# Patient Record
Sex: FEMALE | Race: WHITE | Employment: OTHER | URBAN - METROPOLITAN AREA
[De-identification: names, ages, dates, MRNs, and addresses within clinical notes are randomized per-mention and may not be internally consistent; named-entity substitution may affect disease eponyms.]

---

## 2017-01-26 ENCOUNTER — LAB ENCOUNTER (OUTPATIENT)
Dept: LAB | Facility: HOSPITAL | Age: 82
End: 2017-01-26
Attending: INTERNAL MEDICINE
Payer: MEDICARE

## 2017-01-26 DIAGNOSIS — I25.10 CORONARY ATHEROSCLEROSIS OF NATIVE CORONARY ARTERY: Primary | ICD-10-CM

## 2017-02-17 ENCOUNTER — APPOINTMENT (OUTPATIENT)
Dept: LAB | Facility: HOSPITAL | Age: 82
End: 2017-02-17
Attending: INTERNAL MEDICINE
Payer: MEDICARE

## 2017-02-17 ENCOUNTER — PRIOR ORIGINAL RECORDS (OUTPATIENT)
Dept: OTHER | Age: 82
End: 2017-02-17

## 2017-02-17 LAB
ALBUMIN SERPL BCP-MCNC: 4 G/DL (ref 3.5–4.8)
ALBUMIN/GLOB SERPL: 1.6 {RATIO} (ref 1–2)
ALP SERPL-CCNC: 64 U/L (ref 32–100)
ALT SERPL-CCNC: 15 U/L (ref 14–54)
ANION GAP SERPL CALC-SCNC: 10 MMOL/L (ref 0–18)
AST SERPL-CCNC: 23 U/L (ref 15–41)
BILIRUB SERPL-MCNC: 1.4 MG/DL (ref 0.3–1.2)
BUN SERPL-MCNC: 13 MG/DL (ref 8–20)
BUN/CREAT SERPL: 18.3 (ref 10–20)
CALCIUM SERPL-MCNC: 9.3 MG/DL (ref 8.5–10.5)
CHLORIDE SERPL-SCNC: 106 MMOL/L (ref 95–110)
CHOLEST SERPL-MCNC: 127 MG/DL (ref 110–200)
CO2 SERPL-SCNC: 23 MMOL/L (ref 22–32)
CREAT SERPL-MCNC: 0.71 MG/DL (ref 0.5–1.5)
GLOBULIN PLAS-MCNC: 2.5 G/DL (ref 2.5–3.7)
GLUCOSE SERPL-MCNC: 97 MG/DL (ref 70–99)
HDLC SERPL-MCNC: 44 MG/DL
LDLC SERPL CALC-MCNC: 59 MG/DL (ref 0–99)
NONHDLC SERPL-MCNC: 83 MG/DL
OSMOLALITY UR CALC.SUM OF ELEC: 288 MOSM/KG (ref 275–295)
POTASSIUM SERPL-SCNC: 4.5 MMOL/L (ref 3.3–5.1)
PROT SERPL-MCNC: 6.5 G/DL (ref 5.9–8.4)
SODIUM SERPL-SCNC: 139 MMOL/L (ref 136–144)
TRIGL SERPL-MCNC: 121 MG/DL (ref 1–149)

## 2017-02-17 PROCEDURE — 80061 LIPID PANEL: CPT

## 2017-02-17 PROCEDURE — 80053 COMPREHEN METABOLIC PANEL: CPT

## 2017-02-17 PROCEDURE — 36415 COLL VENOUS BLD VENIPUNCTURE: CPT

## 2017-03-07 ENCOUNTER — PRIOR ORIGINAL RECORDS (OUTPATIENT)
Dept: OTHER | Age: 82
End: 2017-03-07

## 2017-03-07 LAB
ALBUMIN: 4 G/DL
ALT (SGPT): 15 U/L
AST (SGOT): 23 U/L
BILIRUBIN TOTAL: 1.4 MG/DL
BUN: 13 MG/DL
CALCIUM: 9.3 MG/DL
CHLORIDE: 106 MEQ/L
CHOLESTEROL, TOTAL: 127 MG/DL
CREATININE, SERUM: 0.71 MG/DL
GLOBULIN: 2.5 G/DL
GLUCOSE: 97 MG/DL
GLUCOSE: 97 MG/DL
HDL CHOLESTEROL: 44 MG/DL
LDL CHOLESTEROL: 59 MG/DL
NON-HDL CHOLESTEROL: 83 MG/DL
POTASSIUM, SERUM: 4.5 MEQ/L
PROTEIN, TOTAL: 6.5 G/DL
SGOT (AST): 23 IU/L
SGPT (ALT): 15 IU/L
SODIUM: 139 MEQ/L
TRIGLYCERIDES: 121 MG/DL

## 2017-12-20 ENCOUNTER — PRIOR ORIGINAL RECORDS (OUTPATIENT)
Dept: OTHER | Age: 82
End: 2017-12-20

## 2018-04-06 ENCOUNTER — LAB ENCOUNTER (OUTPATIENT)
Dept: LAB | Facility: HOSPITAL | Age: 83
End: 2018-04-06
Attending: INTERNAL MEDICINE
Payer: MEDICARE

## 2018-04-06 DIAGNOSIS — M47.816 LUMBAR SPONDYLOSIS: ICD-10-CM

## 2018-04-06 DIAGNOSIS — M25.561 CHRONIC PAIN OF RIGHT KNEE: ICD-10-CM

## 2018-04-06 DIAGNOSIS — I10 ESSENTIAL HYPERTENSION: ICD-10-CM

## 2018-04-06 DIAGNOSIS — M19.90 ARTHRITIS: ICD-10-CM

## 2018-04-06 DIAGNOSIS — G89.29 CHRONIC PAIN OF RIGHT KNEE: ICD-10-CM

## 2018-04-06 DIAGNOSIS — E78.00 HIGH CHOLESTEROL: ICD-10-CM

## 2018-04-06 PROCEDURE — 80061 LIPID PANEL: CPT

## 2018-04-06 PROCEDURE — 80076 HEPATIC FUNCTION PANEL: CPT

## 2018-04-06 PROCEDURE — 84443 ASSAY THYROID STIM HORMONE: CPT

## 2018-04-06 PROCEDURE — 80069 RENAL FUNCTION PANEL: CPT

## 2018-04-06 PROCEDURE — 36415 COLL VENOUS BLD VENIPUNCTURE: CPT

## 2018-04-06 PROCEDURE — 85025 COMPLETE CBC W/AUTO DIFF WBC: CPT

## 2018-05-18 PROBLEM — M54.16 LUMBAR RADICULOPATHY: Status: ACTIVE | Noted: 2018-05-18

## 2018-05-18 PROBLEM — E78.00 HIGH CHOLESTEROL: Status: ACTIVE | Noted: 2018-05-18

## 2018-05-18 PROBLEM — M25.561 CHRONIC PAIN OF RIGHT KNEE: Status: ACTIVE | Noted: 2018-05-18

## 2018-05-18 PROBLEM — G89.29 CHRONIC PAIN OF RIGHT KNEE: Status: ACTIVE | Noted: 2018-05-18

## 2018-05-18 PROBLEM — I10 ESSENTIAL HYPERTENSION: Status: ACTIVE | Noted: 2018-05-18

## 2018-07-22 PROBLEM — I70.0 AORTIC ATHEROSCLEROSIS: Status: ACTIVE | Noted: 2018-07-22

## 2018-07-22 PROBLEM — I70.0 AORTIC ATHEROSCLEROSIS (HCC): Status: ACTIVE | Noted: 2018-07-22

## 2018-08-07 ENCOUNTER — PRIOR ORIGINAL RECORDS (OUTPATIENT)
Dept: OTHER | Age: 83
End: 2018-08-07

## 2018-12-19 ENCOUNTER — MYAURORA ACCOUNT LINK (OUTPATIENT)
Dept: OTHER | Age: 83
End: 2018-12-19

## 2018-12-19 ENCOUNTER — PRIOR ORIGINAL RECORDS (OUTPATIENT)
Dept: OTHER | Age: 83
End: 2018-12-19

## 2019-02-28 VITALS
HEIGHT: 58 IN | SYSTOLIC BLOOD PRESSURE: 96 MMHG | WEIGHT: 170 LBS | BODY MASS INDEX: 35.68 KG/M2 | DIASTOLIC BLOOD PRESSURE: 56 MMHG | HEART RATE: 58 BPM | OXYGEN SATURATION: 98 %

## 2019-02-28 VITALS
BODY MASS INDEX: 35.48 KG/M2 | DIASTOLIC BLOOD PRESSURE: 68 MMHG | RESPIRATION RATE: 18 BRPM | HEIGHT: 58 IN | WEIGHT: 169 LBS | SYSTOLIC BLOOD PRESSURE: 120 MMHG | HEART RATE: 53 BPM

## 2019-04-19 RX ORDER — NAPROXEN 500 MG/1
TABLET ORAL
COMMUNITY
End: 2021-05-12 | Stop reason: ALTCHOICE

## 2019-04-19 RX ORDER — LOSARTAN POTASSIUM 50 MG/1
50 TABLET ORAL DAILY
COMMUNITY

## 2019-04-19 RX ORDER — METOPROLOL TARTRATE 50 MG/1
50 TABLET, FILM COATED ORAL 2 TIMES DAILY
COMMUNITY

## 2019-04-19 RX ORDER — HYDROCHLOROTHIAZIDE 12.5 MG/1
12.5 TABLET ORAL DAILY
COMMUNITY

## 2019-04-19 RX ORDER — SIMVASTATIN 40 MG
40 TABLET ORAL NIGHTLY
COMMUNITY

## 2019-05-04 PROBLEM — I10 ESSENTIAL HYPERTENSION: Status: RESOLVED | Noted: 2018-05-18 | Resolved: 2019-05-04

## 2019-05-06 PROBLEM — R01.1 HEART MURMUR: Status: ACTIVE | Noted: 2019-05-06

## 2019-05-06 PROBLEM — M25.562 LEFT KNEE PAIN, UNSPECIFIED CHRONICITY: Status: ACTIVE | Noted: 2018-05-18

## 2019-07-10 ENCOUNTER — TELEPHONE (OUTPATIENT)
Dept: CARDIOLOGY | Age: 84
End: 2019-07-10

## 2019-07-18 RX ORDER — PANTOPRAZOLE SODIUM 40 MG/1
TABLET, DELAYED RELEASE ORAL
COMMUNITY
Start: 2014-07-31

## 2019-07-18 RX ORDER — GABAPENTIN 300 MG/1
CAPSULE ORAL
COMMUNITY
Start: 2018-08-08

## 2019-07-18 SDOH — HEALTH STABILITY: MENTAL HEALTH: HOW OFTEN DO YOU HAVE A DRINK CONTAINING ALCOHOL?: NEVER

## 2019-07-24 ENCOUNTER — OFFICE VISIT (OUTPATIENT)
Dept: CARDIOLOGY | Age: 84
End: 2019-07-24

## 2019-07-24 VITALS
BODY MASS INDEX: 34.07 KG/M2 | DIASTOLIC BLOOD PRESSURE: 66 MMHG | HEIGHT: 59 IN | WEIGHT: 169 LBS | SYSTOLIC BLOOD PRESSURE: 130 MMHG | OXYGEN SATURATION: 97 % | HEART RATE: 62 BPM

## 2019-07-24 DIAGNOSIS — I25.10 ATHEROSCLEROSIS OF NATIVE CORONARY ARTERY OF NATIVE HEART WITHOUT ANGINA PECTORIS: Primary | ICD-10-CM

## 2019-07-24 DIAGNOSIS — E78.5 DYSLIPIDEMIA: ICD-10-CM

## 2019-07-24 PROCEDURE — 3078F DIAST BP <80 MM HG: CPT | Performed by: INTERNAL MEDICINE

## 2019-07-24 PROCEDURE — 99214 OFFICE O/P EST MOD 30 MIN: CPT | Performed by: INTERNAL MEDICINE

## 2019-07-24 PROCEDURE — 3075F SYST BP GE 130 - 139MM HG: CPT | Performed by: INTERNAL MEDICINE

## 2019-07-24 ASSESSMENT — PATIENT HEALTH QUESTIONNAIRE - PHQ9
2. FEELING DOWN, DEPRESSED OR HOPELESS: NOT AT ALL
SUM OF ALL RESPONSES TO PHQ9 QUESTIONS 1 AND 2: 0
SUM OF ALL RESPONSES TO PHQ9 QUESTIONS 1 AND 2: 0
1. LITTLE INTEREST OR PLEASURE IN DOING THINGS: NOT AT ALL

## 2019-07-26 ENCOUNTER — APPOINTMENT (OUTPATIENT)
Dept: LAB | Age: 84
End: 2019-07-26
Attending: INTERNAL MEDICINE
Payer: MEDICARE

## 2019-07-26 DIAGNOSIS — E78.00 HIGH CHOLESTEROL: ICD-10-CM

## 2019-07-26 DIAGNOSIS — I10 ESSENTIAL HYPERTENSION: ICD-10-CM

## 2019-07-26 LAB
ALBUMIN SERPL-MCNC: 3.8 G/DL (ref 3.4–5)
ANION GAP SERPL CALC-SCNC: 5 MMOL/L (ref 0–18)
BUN BLD-MCNC: 17 MG/DL (ref 7–18)
BUN/CREAT SERPL: 23.9 (ref 10–20)
CALCIUM BLD-MCNC: 9 MG/DL (ref 8.5–10.1)
CHLORIDE SERPL-SCNC: 105 MMOL/L (ref 98–112)
CHOLEST SMN-MCNC: 116 MG/DL (ref ?–200)
CO2 SERPL-SCNC: 30 MMOL/L (ref 21–32)
CREAT BLD-MCNC: 0.71 MG/DL (ref 0.55–1.02)
GLUCOSE BLD-MCNC: 102 MG/DL (ref 70–99)
HDLC SERPL-MCNC: 53 MG/DL (ref 40–59)
LDLC SERPL CALC-MCNC: 41 MG/DL (ref ?–100)
NONHDLC SERPL-MCNC: 63 MG/DL (ref ?–130)
OSMOLALITY SERPL CALC.SUM OF ELEC: 292 MOSM/KG (ref 275–295)
PATIENT FASTING: YES
PHOSPHATE SERPL-MCNC: 2.7 MG/DL (ref 2.5–4.9)
POTASSIUM SERPL-SCNC: 3.9 MMOL/L (ref 3.5–5.1)
SODIUM SERPL-SCNC: 140 MMOL/L (ref 136–145)
TRIGL SERPL-MCNC: 109 MG/DL (ref 30–149)
VLDLC SERPL CALC-MCNC: 22 MG/DL (ref 0–30)

## 2019-07-26 PROCEDURE — 85025 COMPLETE CBC W/AUTO DIFF WBC: CPT | Performed by: INTERNAL MEDICINE

## 2019-07-26 PROCEDURE — 84443 ASSAY THYROID STIM HORMONE: CPT | Performed by: INTERNAL MEDICINE

## 2019-07-26 PROCEDURE — 80069 RENAL FUNCTION PANEL: CPT

## 2019-07-26 PROCEDURE — 80076 HEPATIC FUNCTION PANEL: CPT | Performed by: INTERNAL MEDICINE

## 2019-07-26 PROCEDURE — 80061 LIPID PANEL: CPT

## 2019-07-26 PROCEDURE — 36415 COLL VENOUS BLD VENIPUNCTURE: CPT

## 2019-07-26 PROCEDURE — 36415 COLL VENOUS BLD VENIPUNCTURE: CPT | Performed by: INTERNAL MEDICINE

## 2019-08-15 PROBLEM — I35.1 AORTIC VALVE INSUFFICIENCY, ETIOLOGY OF CARDIAC VALVE DISEASE UNSPECIFIED: Status: ACTIVE | Noted: 2019-08-15

## 2019-08-15 PROBLEM — I35.0 NONRHEUMATIC AORTIC VALVE STENOSIS: Status: ACTIVE | Noted: 2019-08-15

## 2019-08-23 ENCOUNTER — HOSPITAL ENCOUNTER (OUTPATIENT)
Dept: MRI IMAGING | Age: 84
Discharge: HOME | End: 2019-08-23
Attending: ANESTHESIOLOGY
Payer: MEDICARE

## 2019-08-23 DIAGNOSIS — M48.062 SPINAL STENOSIS OF LUMBAR REGION WITH NEUROGENIC CLAUDICATION: ICD-10-CM

## 2019-08-23 PROCEDURE — 72148 MRI LUMBAR SPINE W/O DYE: CPT | Performed by: ANESTHESIOLOGY

## 2019-09-06 ENCOUNTER — HOSPITAL ENCOUNTER (OUTPATIENT)
Facility: HOSPITAL | Age: 84
Setting detail: HOSPITAL OUTPATIENT SURGERY
Discharge: HOME | End: 2019-09-06
Attending: ANESTHESIOLOGY | Admitting: ANESTHESIOLOGY
Payer: MEDICARE

## 2019-09-06 ENCOUNTER — APPOINTMENT (OUTPATIENT)
Dept: GENERAL RADIOLOGY | Facility: HOSPITAL | Age: 84
End: 2019-09-06
Attending: ANESTHESIOLOGY
Payer: MEDICARE

## 2019-09-06 VITALS — SYSTOLIC BLOOD PRESSURE: 145 MMHG | HEART RATE: 82 BPM | DIASTOLIC BLOOD PRESSURE: 58 MMHG | OXYGEN SATURATION: 95 %

## 2019-09-06 PROCEDURE — 3E0R33Z INTRODUCTION OF ANTI-INFLAMMATORY INTO SPINAL CANAL, PERCUTANEOUS APPROACH: ICD-10-PCS | Performed by: ANESTHESIOLOGY

## 2019-09-06 PROCEDURE — B01B1ZZ FLUOROSCOPY OF SPINAL CORD USING LOW OSMOLAR CONTRAST: ICD-10-PCS | Performed by: ANESTHESIOLOGY

## 2019-09-06 RX ORDER — TRIAMCINOLONE ACETONIDE 40 MG/ML
INJECTION, SUSPENSION INTRA-ARTICULAR; INTRAMUSCULAR AS NEEDED
Status: DISCONTINUED | OUTPATIENT
Start: 2019-09-06 | End: 2019-09-06 | Stop reason: HOSPADM

## 2019-09-06 RX ORDER — LIDOCAINE HYDROCHLORIDE 10 MG/ML
INJECTION, SOLUTION EPIDURAL; INFILTRATION; INTRACAUDAL; PERINEURAL AS NEEDED
Status: DISCONTINUED | OUTPATIENT
Start: 2019-09-06 | End: 2019-09-06 | Stop reason: HOSPADM

## 2019-09-06 NOTE — OPERATIVE REPORT
PREOPERATIVE DIAGNOSIS: Lumbar spinal stenosis with claudication  (primary encounter diagnosis)     POSTOPERATIVE DIAGNOSIS: Lumbar spinal stenosis with claudication (primary encounter diagnosis)     PROCEDURE PERFORMED: Lumbar Epidural Steroid Injecti injection sites. Discharge instructions were reviewed with the patient. Patient verbalizes understanding. sHe will follow up in 2 weeks for Discussion of alternative options.     MAINE#[id number]

## 2019-09-06 NOTE — H&P
Patient complains of low back pain. 82 yr old female with yrs of back pain, mostly axial, but radiates to the left buttock. Pain is burning and aching, worse with walking, improves with sitting.     Patient denies bowel or bladder dysfunction.   Patient d 40 MG Oral Tab EC TAKE 1 TABLET(40 MG) BY MOUTH EVERY MORNING BEFORE BREAKFAST   GABAPENTIN 300 MG Oral Cap TAKE 1 CAPSULE(300 MG) BY MOUTH EVERY NIGHT   LOSARTAN POTASSIUM 50 MG Oral Tab TAKE 1 TABLET(50 MG) BY MOUTH DAILY   gabapentin (NEURONTIN) 300 MG noted.     Palpation:  Tender left SI joint/gluteal/lumbar paraspinal chau     Deep Tendon Reflexes:  Right patella 1/4, Left patella 1/4, Right Achilles 1/4, Left Achilles 1/4  DTR grossly intact throughout bilateral upper extremities, 2/4 throughout

## 2020-01-14 PROBLEM — I70.0 ATHEROSCLEROSIS OF AORTA: Status: ACTIVE | Noted: 2018-07-22

## 2020-01-14 PROBLEM — I70.0 ATHEROSCLEROSIS OF AORTA (HCC): Status: ACTIVE | Noted: 2018-07-22

## 2020-01-14 PROBLEM — M48.061 SPINAL STENOSIS AT L4-L5 LEVEL: Status: ACTIVE | Noted: 2020-01-14

## 2020-03-03 ENCOUNTER — LAB ENCOUNTER (OUTPATIENT)
Dept: LAB | Age: 85
End: 2020-03-03
Attending: INTERNAL MEDICINE
Payer: MEDICARE

## 2020-03-03 DIAGNOSIS — E78.00 HIGH CHOLESTEROL: ICD-10-CM

## 2020-03-03 LAB
ABSOLUTE IMMATURE GRANULOCYTES (OFFPRE24): NORMAL
ALBUMIN SERPL-MCNC: 4 G/DL
ALBUMIN/GLOB SERPL: 1.4 {RATIO}
ALP SERPL-CCNC: 63 U/L
ALT SERPL-CCNC: 14 U/L
ANION GAP SERPL CALC-SCNC: 5 MMOL/L
AST SERPL-CCNC: 15 U/L
BASO+EOS+MONOS # BLD: NORMAL 10*3/UL
BASO+EOS+MONOS NFR BLD: NORMAL %
BASOPHILS # BLD: NORMAL 10*3/UL
BASOPHILS NFR BLD: NORMAL %
BILIRUB SERPL-MCNC: 0.9 MG/DL
BUN SERPL-MCNC: 20 MG/DL
BUN/CREAT SERPL: 19.8
CALCIUM SERPL-MCNC: 9.5 MG/DL
CHLORIDE SERPL-SCNC: 107 MMOL/L
CHOLEST SERPL-MCNC: 132 MG/DL
CHOLEST SMN-MCNC: 132 MG/DL (ref ?–200)
CHOLEST/HDLC SERPL: NORMAL {RATIO}
CO2 SERPL-SCNC: 29 MMOL/L
CREAT SERPL-MCNC: 1.01 MG/DL
DIFFERENTIAL METHOD BLD: NORMAL
EOSINOPHIL # BLD: NORMAL 10*3/UL
EOSINOPHIL NFR BLD: NORMAL %
ERYTHROCYTE [DISTWIDTH] IN BLOOD: NORMAL %
GLOBULIN SER-MCNC: 2.8 G/DL
GLUCOSE SERPL-MCNC: 90 MG/DL
HCT VFR BLD CALC: 39.9 %
HDLC SERPL-MCNC: 58 MG/DL
HDLC SERPL-MCNC: 58 MG/DL (ref 40–59)
HGB BLD-MCNC: 12.8 G/DL
IMMATURE GRANULOCYTES (OFFPRE25): NORMAL
LDLC SERPL CALC-MCNC: 52 MG/DL
LDLC SERPL CALC-MCNC: 52 MG/DL (ref ?–100)
LENGTH OF FAST TIME PATIENT: NORMAL H
LENGTH OF FAST TIME PATIENT: NORMAL H
LYMPHOCYTES # BLD: NORMAL 10*3/UL
LYMPHOCYTES NFR BLD: NORMAL %
MCH RBC QN AUTO: NORMAL PG
MCHC RBC AUTO-ENTMCNC: NORMAL G/DL
MCV RBC AUTO: NORMAL FL
MONOCYTES # BLD: NORMAL 10*3/UL
MONOCYTES NFR BLD: NORMAL %
MPV (OFFPRE2): NORMAL
NEUTROPHILS # BLD: NORMAL 10*3/UL
NEUTROPHILS NFR BLD: NORMAL %
NONHDLC SERPL-MCNC: 74 MG/DL
NONHDLC SERPL-MCNC: 74 MG/DL (ref ?–130)
NRBC BLD MANUAL-RTO: NORMAL %
PATIENT FASTING Y/N/NP: YES
PLAT MORPH BLD: NORMAL
PLATELET # BLD: 224 10*3/UL
POTASSIUM SERPL-SCNC: 4.3 MMOL/L
PROT SERPL-MCNC: 6.8 G/DL
RBC # BLD: 4.41 10*6/UL
RBC MORPH BLD: NORMAL
SODIUM SERPL-SCNC: 141 MMOL/L
TRIGL SERPL-MCNC: 108 MG/DL
TRIGL SERPL-MCNC: 108 MG/DL (ref 30–149)
TSH SERPL-ACNC: 2.02 M[IU]/L
VLDLC SERPL CALC-MCNC: 22 MG/DL
VLDLC SERPL CALC-MCNC: 22 MG/DL (ref 0–30)
WBC # BLD: 6 10*3/UL
WBC MORPH BLD: NORMAL

## 2020-03-03 PROCEDURE — 36415 COLL VENOUS BLD VENIPUNCTURE: CPT

## 2020-03-03 PROCEDURE — 85025 COMPLETE CBC W/AUTO DIFF WBC: CPT | Performed by: INTERNAL MEDICINE

## 2020-03-03 PROCEDURE — 80061 LIPID PANEL: CPT

## 2020-03-03 PROCEDURE — 80053 COMPREHEN METABOLIC PANEL: CPT | Performed by: INTERNAL MEDICINE

## 2020-03-03 PROCEDURE — 84443 ASSAY THYROID STIM HORMONE: CPT | Performed by: INTERNAL MEDICINE

## 2020-03-03 PROCEDURE — 36415 COLL VENOUS BLD VENIPUNCTURE: CPT | Performed by: INTERNAL MEDICINE

## 2020-03-04 ENCOUNTER — TELEPHONE (OUTPATIENT)
Dept: CARDIOLOGY | Age: 85
End: 2020-03-04

## 2020-03-04 PROBLEM — I25.10 CORONARY ARTERY DISEASE INVOLVING NATIVE CORONARY ARTERY OF NATIVE HEART, ANGINA PRESENCE UNSPECIFIED: Status: ACTIVE | Noted: 2020-03-04

## 2020-03-04 PROBLEM — R07.9 CHEST PAIN, UNSPECIFIED TYPE: Status: ACTIVE | Noted: 2020-03-04

## 2020-03-05 PROBLEM — R07.2 PRECORDIAL PAIN: Status: ACTIVE | Noted: 2020-03-05

## 2020-03-06 ENCOUNTER — OFFICE VISIT (OUTPATIENT)
Dept: CARDIOLOGY | Age: 85
End: 2020-03-06

## 2020-03-06 VITALS
SYSTOLIC BLOOD PRESSURE: 130 MMHG | HEIGHT: 57 IN | BODY MASS INDEX: 35.17 KG/M2 | WEIGHT: 163 LBS | OXYGEN SATURATION: 98 % | HEART RATE: 64 BPM | DIASTOLIC BLOOD PRESSURE: 60 MMHG

## 2020-03-06 DIAGNOSIS — I10 ESSENTIAL HYPERTENSION: ICD-10-CM

## 2020-03-06 DIAGNOSIS — I25.10 ATHEROSCLEROSIS OF NATIVE CORONARY ARTERY OF NATIVE HEART WITHOUT ANGINA PECTORIS: Primary | ICD-10-CM

## 2020-03-06 DIAGNOSIS — R07.2 PRECORDIAL PAIN: ICD-10-CM

## 2020-03-06 PROCEDURE — 99214 OFFICE O/P EST MOD 30 MIN: CPT | Performed by: NURSE PRACTITIONER

## 2020-03-06 PROCEDURE — 3078F DIAST BP <80 MM HG: CPT | Performed by: NURSE PRACTITIONER

## 2020-03-06 PROCEDURE — 3075F SYST BP GE 130 - 139MM HG: CPT | Performed by: NURSE PRACTITIONER

## 2020-03-06 RX ORDER — ISOSORBIDE MONONITRATE 30 MG/1
30 TABLET, EXTENDED RELEASE ORAL DAILY
Qty: 30 TABLET | Refills: 3 | Status: SHIPPED | OUTPATIENT
Start: 2020-03-06 | End: 2020-03-24 | Stop reason: ALTCHOICE

## 2020-03-06 SDOH — HEALTH STABILITY: MENTAL HEALTH: HOW OFTEN DO YOU HAVE A DRINK CONTAINING ALCOHOL?: NEVER

## 2020-03-06 ASSESSMENT — PATIENT HEALTH QUESTIONNAIRE - PHQ9
SUM OF ALL RESPONSES TO PHQ9 QUESTIONS 1 AND 2: 0
SUM OF ALL RESPONSES TO PHQ9 QUESTIONS 1 AND 2: 0
2. FEELING DOWN, DEPRESSED OR HOPELESS: NOT AT ALL
1. LITTLE INTEREST OR PLEASURE IN DOING THINGS: NOT AT ALL

## 2020-03-16 ENCOUNTER — HOSPITAL ENCOUNTER (OUTPATIENT)
Dept: NUCLEAR MEDICINE | Facility: HOSPITAL | Age: 85
Discharge: HOME | End: 2020-03-16
Attending: NURSE PRACTITIONER
Payer: MEDICARE

## 2020-03-16 ENCOUNTER — APPOINTMENT (OUTPATIENT)
Dept: NUCLEAR MEDICINE | Facility: HOSPITAL | Age: 85
End: 2020-03-16
Attending: NURSE PRACTITIONER
Payer: MEDICARE

## 2020-03-16 ENCOUNTER — HOSPITAL ENCOUNTER (OUTPATIENT)
Dept: CV DIAGNOSTICS | Facility: HOSPITAL | Age: 85
End: 2020-03-16
Attending: NURSE PRACTITIONER
Payer: MEDICARE

## 2020-03-16 DIAGNOSIS — R07.2 PRECORDIAL PAIN: ICD-10-CM

## 2020-03-16 DIAGNOSIS — I25.10 ATHEROSCLEROSIS OF CORONARY ARTERY OF NATIVE HEART WITHOUT ANGINA PECTORIS: ICD-10-CM

## 2020-03-19 ENCOUNTER — TELEPHONE (OUTPATIENT)
Dept: CARDIOLOGY | Age: 85
End: 2020-03-19

## 2020-03-23 ENCOUNTER — HOSPITAL ENCOUNTER (OUTPATIENT)
Dept: NUCLEAR MEDICINE | Facility: HOSPITAL | Age: 85
Discharge: HOME | End: 2020-03-23
Attending: NURSE PRACTITIONER
Payer: MEDICARE

## 2020-03-23 ENCOUNTER — HOSPITAL ENCOUNTER (OUTPATIENT)
Dept: CV DIAGNOSTICS | Facility: HOSPITAL | Age: 85
Discharge: HOME | End: 2020-03-23
Attending: NURSE PRACTITIONER
Payer: MEDICARE

## 2020-03-23 PROCEDURE — 93016 CV STRESS TEST SUPVJ ONLY: CPT | Performed by: NURSE PRACTITIONER

## 2020-03-23 PROCEDURE — 93017 CV STRESS TEST TRACING ONLY: CPT | Performed by: NURSE PRACTITIONER

## 2020-03-23 PROCEDURE — 93018 CV STRESS TEST I&R ONLY: CPT | Performed by: NURSE PRACTITIONER

## 2020-03-23 PROCEDURE — 78452 HT MUSCLE IMAGE SPECT MULT: CPT | Performed by: NURSE PRACTITIONER

## 2020-03-24 ENCOUNTER — TELEPHONE (OUTPATIENT)
Dept: CARDIOLOGY | Age: 85
End: 2020-03-24

## 2020-06-07 PROBLEM — M25.562 CHRONIC PAIN OF LEFT KNEE: Status: ACTIVE | Noted: 2018-05-18

## 2020-06-07 PROBLEM — R01.1 HEART MURMUR: Status: RESOLVED | Noted: 2019-05-06 | Resolved: 2020-06-07

## 2020-06-07 PROBLEM — I34.0 NONRHEUMATIC MITRAL VALVE REGURGITATION: Status: ACTIVE | Noted: 2020-06-07

## 2020-06-07 PROBLEM — G89.29 CHRONIC PAIN OF LEFT KNEE: Status: ACTIVE | Noted: 2018-05-18

## 2020-06-07 PROBLEM — R07.9 CHEST PAIN, UNSPECIFIED TYPE: Status: RESOLVED | Noted: 2020-03-04 | Resolved: 2020-06-07

## 2020-07-29 ENCOUNTER — OFFICE VISIT (OUTPATIENT)
Dept: CARDIOLOGY | Age: 85
End: 2020-07-29

## 2020-07-29 VITALS
DIASTOLIC BLOOD PRESSURE: 66 MMHG | WEIGHT: 162 LBS | SYSTOLIC BLOOD PRESSURE: 134 MMHG | RESPIRATION RATE: 18 BRPM | HEART RATE: 51 BPM | BODY MASS INDEX: 35.06 KG/M2 | OXYGEN SATURATION: 97 %

## 2020-07-29 DIAGNOSIS — I25.10 ATHEROSCLEROSIS OF NATIVE CORONARY ARTERY OF NATIVE HEART WITHOUT ANGINA PECTORIS: Primary | ICD-10-CM

## 2020-07-29 DIAGNOSIS — E78.5 DYSLIPIDEMIA: ICD-10-CM

## 2020-07-29 PROCEDURE — 99214 OFFICE O/P EST MOD 30 MIN: CPT | Performed by: INTERNAL MEDICINE

## 2020-07-29 PROCEDURE — 3075F SYST BP GE 130 - 139MM HG: CPT | Performed by: INTERNAL MEDICINE

## 2020-07-29 PROCEDURE — 3078F DIAST BP <80 MM HG: CPT | Performed by: INTERNAL MEDICINE

## 2020-07-29 ASSESSMENT — PATIENT HEALTH QUESTIONNAIRE - PHQ9
2. FEELING DOWN, DEPRESSED OR HOPELESS: NOT AT ALL
SUM OF ALL RESPONSES TO PHQ9 QUESTIONS 1 AND 2: 0
1. LITTLE INTEREST OR PLEASURE IN DOING THINGS: NOT AT ALL
CLINICAL INTERPRETATION OF PHQ9 SCORE: NO FURTHER SCREENING NEEDED
SUM OF ALL RESPONSES TO PHQ9 QUESTIONS 1 AND 2: 0
CLINICAL INTERPRETATION OF PHQ2 SCORE: NO FURTHER SCREENING NEEDED

## 2020-08-04 ENCOUNTER — HOSPITAL ENCOUNTER (OUTPATIENT)
Dept: MAMMOGRAPHY | Facility: HOSPITAL | Age: 85
Discharge: HOME | End: 2020-08-04
Attending: INTERNAL MEDICINE
Payer: MEDICARE

## 2020-08-04 DIAGNOSIS — Z12.39 BREAST CANCER SCREENING: ICD-10-CM

## 2020-08-04 PROCEDURE — 77067 SCR MAMMO BI INCL CAD: CPT | Performed by: INTERNAL MEDICINE

## 2020-08-04 PROCEDURE — 77063 BREAST TOMOSYNTHESIS BI: CPT | Performed by: INTERNAL MEDICINE

## 2020-10-22 PROBLEM — M81.0 OSTEOPOROSIS, UNSPECIFIED OSTEOPOROSIS TYPE, UNSPECIFIED PATHOLOGICAL FRACTURE PRESENCE: Status: ACTIVE | Noted: 2020-10-22

## 2020-10-22 PROBLEM — R19.7 DIARRHEA, UNSPECIFIED TYPE: Status: ACTIVE | Noted: 2020-10-22

## 2020-10-22 PROBLEM — R00.1 BRADYCARDIA: Status: ACTIVE | Noted: 2020-10-22

## 2020-12-03 ENCOUNTER — LAB ENCOUNTER (OUTPATIENT)
Dept: LAB | Age: 85
End: 2020-12-03
Attending: INTERNAL MEDICINE
Payer: MEDICARE

## 2020-12-03 DIAGNOSIS — R19.7 DIARRHEA, UNSPECIFIED TYPE: ICD-10-CM

## 2020-12-03 DIAGNOSIS — I10 ESSENTIAL HYPERTENSION: ICD-10-CM

## 2020-12-03 PROCEDURE — 80076 HEPATIC FUNCTION PANEL: CPT

## 2020-12-03 PROCEDURE — 36415 COLL VENOUS BLD VENIPUNCTURE: CPT

## 2020-12-03 PROCEDURE — 85652 RBC SED RATE AUTOMATED: CPT

## 2020-12-03 PROCEDURE — 80069 RENAL FUNCTION PANEL: CPT

## 2021-03-05 DIAGNOSIS — Z23 NEED FOR VACCINATION: ICD-10-CM

## 2021-04-02 PROBLEM — I25.10 CORONARY ARTERY DISEASE INVOLVING NATIVE CORONARY ARTERY OF NATIVE HEART WITHOUT ANGINA PECTORIS: Status: ACTIVE | Noted: 2021-04-02

## 2021-04-02 PROBLEM — M81.0 AGE-RELATED OSTEOPOROSIS WITHOUT CURRENT PATHOLOGICAL FRACTURE: Status: ACTIVE | Noted: 2021-04-02

## 2021-04-02 PROBLEM — M47.26 OSTEOARTHRITIS OF SPINE WITH RADICULOPATHY, LUMBAR REGION: Status: ACTIVE | Noted: 2021-04-02

## 2021-04-02 PROBLEM — G89.29 CHRONIC INTRACTABLE PAIN: Status: ACTIVE | Noted: 2021-04-02

## 2021-04-02 PROBLEM — E78.00 HYPERCHOLESTEROLEMIA: Status: ACTIVE | Noted: 2021-04-02

## 2021-04-14 ENCOUNTER — TELEPHONE (OUTPATIENT)
Dept: CARDIOLOGY | Age: 86
End: 2021-04-14

## 2021-05-04 PROBLEM — I77.810 AORTIC ROOT DILATATION (HCC): Status: ACTIVE | Noted: 2021-05-04

## 2021-05-04 PROBLEM — I77.810 AORTIC ROOT DILATATION: Status: ACTIVE | Noted: 2021-05-04

## 2021-05-12 ENCOUNTER — TELEPHONE (OUTPATIENT)
Dept: CARDIOLOGY | Age: 86
End: 2021-05-12

## 2021-05-12 ENCOUNTER — OFFICE VISIT (OUTPATIENT)
Dept: CARDIOLOGY | Age: 86
End: 2021-05-12

## 2021-05-12 VITALS
RESPIRATION RATE: 18 BRPM | HEIGHT: 57 IN | OXYGEN SATURATION: 97 % | SYSTOLIC BLOOD PRESSURE: 112 MMHG | BODY MASS INDEX: 32.15 KG/M2 | HEART RATE: 59 BPM | DIASTOLIC BLOOD PRESSURE: 60 MMHG | WEIGHT: 149 LBS

## 2021-05-12 DIAGNOSIS — I25.10 ATHEROSCLEROSIS OF NATIVE CORONARY ARTERY OF NATIVE HEART WITHOUT ANGINA PECTORIS: ICD-10-CM

## 2021-05-12 DIAGNOSIS — Z01.818 PRE-OP EVALUATION: Primary | ICD-10-CM

## 2021-05-12 DIAGNOSIS — E78.5 DYSLIPIDEMIA: ICD-10-CM

## 2021-05-12 PROCEDURE — 3078F DIAST BP <80 MM HG: CPT | Performed by: INTERNAL MEDICINE

## 2021-05-12 PROCEDURE — 99214 OFFICE O/P EST MOD 30 MIN: CPT | Performed by: INTERNAL MEDICINE

## 2021-05-12 PROCEDURE — 3074F SYST BP LT 130 MM HG: CPT | Performed by: INTERNAL MEDICINE

## 2021-05-12 PROCEDURE — 93000 ELECTROCARDIOGRAM COMPLETE: CPT | Performed by: INTERNAL MEDICINE

## 2021-05-12 RX ORDER — ACETAMINOPHEN 500 MG
500 TABLET ORAL EVERY 6 HOURS PRN
COMMUNITY

## 2021-05-12 ASSESSMENT — PATIENT HEALTH QUESTIONNAIRE - PHQ9
CLINICAL INTERPRETATION OF PHQ9 SCORE: NO FURTHER SCREENING NEEDED
SUM OF ALL RESPONSES TO PHQ9 QUESTIONS 1 AND 2: 0
2. FEELING DOWN, DEPRESSED OR HOPELESS: NOT AT ALL
1. LITTLE INTEREST OR PLEASURE IN DOING THINGS: NOT AT ALL
SUM OF ALL RESPONSES TO PHQ9 QUESTIONS 1 AND 2: 0
CLINICAL INTERPRETATION OF PHQ2 SCORE: NO FURTHER SCREENING NEEDED

## 2021-05-19 NOTE — H&P (VIEW-ONLY)
Patient: Con Fermin  Medical Record Number: [id number]  Referring Physician:  No ref.  provider found  PCP: Shari Burden MD    HISTORY OF CHIEF COMPLAINT:    CC:Symptomatic Stenosis    HPI: Con Fermin is a 82year old female with sy of Spine Surgery  [facility]          Historical Data:     Histories:  Past Medical History:   Diagnosis Date   • Coronary artery disease     stent years ago at [facility]. Unclear details. Subsequent stress test negative.    • Esophageal reflux MG) BY MOUTH TWICE DAILY 180 tablet 1   • aspirin 81 MG Oral Tab Take 81 mg by mouth daily. (Patient not taking: Reported on 5/18/2021 )          Review of Systems:  A comprehensive 10 point review of systems was completed.   Pertinent positives and negativ

## 2021-05-23 ENCOUNTER — LAB ENCOUNTER (OUTPATIENT)
Dept: LAB | Facility: HOSPITAL | Age: 86
DRG: 460 | End: 2021-05-23
Attending: ORTHOPAEDIC SURGERY
Payer: MEDICARE

## 2021-05-23 DIAGNOSIS — Z01.818 PREOP TESTING: ICD-10-CM

## 2021-05-23 PROCEDURE — 86850 RBC ANTIBODY SCREEN: CPT

## 2021-05-23 PROCEDURE — 86901 BLOOD TYPING SEROLOGIC RH(D): CPT

## 2021-05-23 PROCEDURE — 86900 BLOOD TYPING SEROLOGIC ABO: CPT

## 2021-05-23 PROCEDURE — 36415 COLL VENOUS BLD VENIPUNCTURE: CPT

## 2021-05-25 ENCOUNTER — ANESTHESIA EVENT (OUTPATIENT)
Dept: SURGERY | Facility: HOSPITAL | Age: 86
DRG: 460 | End: 2021-05-25
Payer: MEDICARE

## 2021-05-25 ENCOUNTER — HOSPITAL ENCOUNTER (INPATIENT)
Facility: HOSPITAL | Age: 86
LOS: 3 days | Discharge: HOME HEALTH | DRG: 460 | End: 2021-05-28
Attending: ORTHOPAEDIC SURGERY | Admitting: ORTHOPAEDIC SURGERY
Payer: MEDICARE

## 2021-05-25 ENCOUNTER — ANESTHESIA (OUTPATIENT)
Dept: SURGERY | Facility: HOSPITAL | Age: 86
DRG: 460 | End: 2021-05-25
Payer: MEDICARE

## 2021-05-25 ENCOUNTER — APPOINTMENT (OUTPATIENT)
Dept: GENERAL RADIOLOGY | Facility: HOSPITAL | Age: 86
DRG: 460 | End: 2021-05-25
Attending: ORTHOPAEDIC SURGERY
Payer: MEDICARE

## 2021-05-25 DIAGNOSIS — Z01.818 PREOP TESTING: Primary | ICD-10-CM

## 2021-05-25 DIAGNOSIS — M71.38 SYNOVIAL CYST OF LUMBAR FACET JOINT: ICD-10-CM

## 2021-05-25 DIAGNOSIS — M48.061 SPINAL STENOSIS OF LUMBAR REGION, UNSPECIFIED WHETHER NEUROGENIC CLAUDICATION PRESENT: ICD-10-CM

## 2021-05-25 PROBLEM — M43.16 SPONDYLOLISTHESIS AT L4-L5 LEVEL: Status: ACTIVE | Noted: 2021-05-25

## 2021-05-25 PROCEDURE — 01NB0ZZ RELEASE LUMBAR NERVE, OPEN APPROACH: ICD-10-PCS | Performed by: ORTHOPAEDIC SURGERY

## 2021-05-25 PROCEDURE — 4A11X4G MONITORING OF PERIPHERAL NERVOUS ELECTRICAL ACTIVITY, INTRAOPERATIVE, EXTERNAL APPROACH: ICD-10-PCS | Performed by: ORTHOPAEDIC SURGERY

## 2021-05-25 PROCEDURE — 95938 SOMATOSENSORY TESTING: CPT | Performed by: ORTHOPAEDIC SURGERY

## 2021-05-25 PROCEDURE — 0SG1071 FUSION OF 2 OR MORE LUMBAR VERTEBRAL JOINTS WITH AUTOLOGOUS TISSUE SUBSTITUTE, POSTERIOR APPROACH, POSTERIOR COLUMN, OPEN APPROACH: ICD-10-PCS | Performed by: ORTHOPAEDIC SURGERY

## 2021-05-25 PROCEDURE — 76000 FLUOROSCOPY <1 HR PHYS/QHP: CPT | Performed by: ORTHOPAEDIC SURGERY

## 2021-05-25 PROCEDURE — 95860 NEEDLE EMG 1 EXTREMITY: CPT | Performed by: ORTHOPAEDIC SURGERY

## 2021-05-25 DEVICE — RELINE LCK SCRW 5.5 OPEN TULIP: Type: IMPLANTABLE DEVICE | Site: BACK | Status: FUNCTIONAL

## 2021-05-25 DEVICE — SCREW RELINE BN SPNL 45MM 6.5: Type: IMPLANTABLE DEVICE | Site: BACK | Status: FUNCTIONAL

## 2021-05-25 DEVICE — ATTRAX PUTTY 10CC US: Type: IMPLANTABLE DEVICE | Site: BACK | Status: FUNCTIONAL

## 2021-05-25 DEVICE — IMPLANTABLE DEVICE: Type: IMPLANTABLE DEVICE | Site: BACK | Status: FUNCTIONAL

## 2021-05-25 RX ORDER — SODIUM CHLORIDE, SODIUM LACTATE, POTASSIUM CHLORIDE, CALCIUM CHLORIDE 600; 310; 30; 20 MG/100ML; MG/100ML; MG/100ML; MG/100ML
INJECTION, SOLUTION INTRAVENOUS CONTINUOUS
Status: DISCONTINUED | OUTPATIENT
Start: 2021-05-25 | End: 2021-05-25 | Stop reason: HOSPADM

## 2021-05-25 RX ORDER — ACETAMINOPHEN 500 MG
1000 TABLET ORAL ONCE
Status: DISCONTINUED | OUTPATIENT
Start: 2021-05-25 | End: 2021-05-25 | Stop reason: HOSPADM

## 2021-05-25 RX ORDER — NALOXONE HYDROCHLORIDE 0.4 MG/ML
80 INJECTION, SOLUTION INTRAMUSCULAR; INTRAVENOUS; SUBCUTANEOUS AS NEEDED
Status: DISCONTINUED | OUTPATIENT
Start: 2021-05-25 | End: 2021-05-25 | Stop reason: HOSPADM

## 2021-05-25 RX ORDER — SENNOSIDES 8.6 MG
17.2 TABLET ORAL NIGHTLY
Status: DISCONTINUED | OUTPATIENT
Start: 2021-05-25 | End: 2021-05-28

## 2021-05-25 RX ORDER — HYDROCODONE BITARTRATE AND ACETAMINOPHEN 10; 325 MG/1; MG/1
1 TABLET ORAL EVERY 4 HOURS PRN
Status: DISCONTINUED | OUTPATIENT
Start: 2021-05-25 | End: 2021-05-27

## 2021-05-25 RX ORDER — MORPHINE SULFATE 4 MG/ML
4 INJECTION, SOLUTION INTRAMUSCULAR; INTRAVENOUS EVERY 2 HOUR PRN
Status: DISCONTINUED | OUTPATIENT
Start: 2021-05-25 | End: 2021-05-27

## 2021-05-25 RX ORDER — ROCURONIUM BROMIDE 10 MG/ML
INJECTION, SOLUTION INTRAVENOUS AS NEEDED
Status: DISCONTINUED | OUTPATIENT
Start: 2021-05-25 | End: 2021-05-25 | Stop reason: SURG

## 2021-05-25 RX ORDER — MORPHINE SULFATE 2 MG/ML
2 INJECTION, SOLUTION INTRAMUSCULAR; INTRAVENOUS EVERY 2 HOUR PRN
Status: DISCONTINUED | OUTPATIENT
Start: 2021-05-25 | End: 2021-05-27

## 2021-05-25 RX ORDER — GLYCOPYRROLATE 0.2 MG/ML
INJECTION, SOLUTION INTRAMUSCULAR; INTRAVENOUS AS NEEDED
Status: DISCONTINUED | OUTPATIENT
Start: 2021-05-25 | End: 2021-05-25 | Stop reason: SURG

## 2021-05-25 RX ORDER — MORPHINE SULFATE 4 MG/ML
4 INJECTION, SOLUTION INTRAMUSCULAR; INTRAVENOUS EVERY 10 MIN PRN
Status: DISCONTINUED | OUTPATIENT
Start: 2021-05-25 | End: 2021-05-25 | Stop reason: HOSPADM

## 2021-05-25 RX ORDER — ONDANSETRON 2 MG/ML
4 INJECTION INTRAMUSCULAR; INTRAVENOUS EVERY 4 HOURS PRN
Status: ACTIVE | OUTPATIENT
Start: 2021-05-25 | End: 2021-05-26

## 2021-05-25 RX ORDER — METOPROLOL TARTRATE 50 MG/1
50 TABLET, FILM COATED ORAL 2 TIMES DAILY
Status: DISCONTINUED | OUTPATIENT
Start: 2021-05-25 | End: 2021-05-28

## 2021-05-25 RX ORDER — GABAPENTIN 300 MG/1
300 CAPSULE ORAL 2 TIMES DAILY
Status: DISCONTINUED | OUTPATIENT
Start: 2021-05-25 | End: 2021-05-27

## 2021-05-25 RX ORDER — HYDROCODONE BITARTRATE AND ACETAMINOPHEN 5; 325 MG/1; MG/1
2 TABLET ORAL AS NEEDED
Status: DISCONTINUED | OUTPATIENT
Start: 2021-05-25 | End: 2021-05-25 | Stop reason: HOSPADM

## 2021-05-25 RX ORDER — HYDROCHLOROTHIAZIDE 12.5 MG/1
12.5 CAPSULE, GELATIN COATED ORAL DAILY
Status: DISCONTINUED | OUTPATIENT
Start: 2021-05-26 | End: 2021-05-28

## 2021-05-25 RX ORDER — SODIUM PHOSPHATE, DIBASIC AND SODIUM PHOSPHATE, MONOBASIC 7; 19 G/133ML; G/133ML
1 ENEMA RECTAL ONCE AS NEEDED
Status: DISCONTINUED | OUTPATIENT
Start: 2021-05-25 | End: 2021-05-28

## 2021-05-25 RX ORDER — PROCHLORPERAZINE EDISYLATE 5 MG/ML
10 INJECTION INTRAMUSCULAR; INTRAVENOUS EVERY 6 HOURS PRN
Status: ACTIVE | OUTPATIENT
Start: 2021-05-25 | End: 2021-05-27

## 2021-05-25 RX ORDER — FAMOTIDINE 20 MG/1
20 TABLET ORAL ONCE
Status: DISCONTINUED | OUTPATIENT
Start: 2021-05-25 | End: 2021-05-25 | Stop reason: HOSPADM

## 2021-05-25 RX ORDER — ATORVASTATIN CALCIUM 20 MG/1
20 TABLET, FILM COATED ORAL NIGHTLY
Status: DISCONTINUED | OUTPATIENT
Start: 2021-05-25 | End: 2021-05-28

## 2021-05-25 RX ORDER — DIPHENHYDRAMINE HCL 25 MG
25 CAPSULE ORAL EVERY 4 HOURS PRN
Status: DISCONTINUED | OUTPATIENT
Start: 2021-05-25 | End: 2021-05-28

## 2021-05-25 RX ORDER — HYDROCODONE BITARTRATE AND ACETAMINOPHEN 10; 325 MG/1; MG/1
2 TABLET ORAL EVERY 4 HOURS PRN
Status: DISCONTINUED | OUTPATIENT
Start: 2021-05-25 | End: 2021-05-27

## 2021-05-25 RX ORDER — DEXAMETHASONE SODIUM PHOSPHATE 4 MG/ML
VIAL (ML) INJECTION AS NEEDED
Status: DISCONTINUED | OUTPATIENT
Start: 2021-05-25 | End: 2021-05-25 | Stop reason: SURG

## 2021-05-25 RX ORDER — SODIUM CHLORIDE 9 MG/ML
INJECTION, SOLUTION INTRAVENOUS CONTINUOUS PRN
Status: DISCONTINUED | OUTPATIENT
Start: 2021-05-25 | End: 2021-05-25 | Stop reason: SURG

## 2021-05-25 RX ORDER — VANCOMYCIN HYDROCHLORIDE 1 G/20ML
INJECTION, POWDER, LYOPHILIZED, FOR SOLUTION INTRAVENOUS AS NEEDED
Status: DISCONTINUED | OUTPATIENT
Start: 2021-05-25 | End: 2021-05-25 | Stop reason: HOSPADM

## 2021-05-25 RX ORDER — BISACODYL 10 MG
10 SUPPOSITORY, RECTAL RECTAL
Status: DISCONTINUED | OUTPATIENT
Start: 2021-05-25 | End: 2021-05-28

## 2021-05-25 RX ORDER — LIDOCAINE HYDROCHLORIDE 10 MG/ML
INJECTION, SOLUTION EPIDURAL; INFILTRATION; INTRACAUDAL; PERINEURAL AS NEEDED
Status: DISCONTINUED | OUTPATIENT
Start: 2021-05-25 | End: 2021-05-25 | Stop reason: SURG

## 2021-05-25 RX ORDER — TIZANIDINE 4 MG/1
4 TABLET ORAL EVERY 8 HOURS PRN
Status: DISCONTINUED | OUTPATIENT
Start: 2021-05-25 | End: 2021-05-27

## 2021-05-25 RX ORDER — MORPHINE SULFATE 4 MG/ML
2 INJECTION, SOLUTION INTRAMUSCULAR; INTRAVENOUS EVERY 10 MIN PRN
Status: DISCONTINUED | OUTPATIENT
Start: 2021-05-25 | End: 2021-05-25 | Stop reason: HOSPADM

## 2021-05-25 RX ORDER — HYDROMORPHONE HYDROCHLORIDE 1 MG/ML
0.2 INJECTION, SOLUTION INTRAMUSCULAR; INTRAVENOUS; SUBCUTANEOUS EVERY 5 MIN PRN
Status: DISCONTINUED | OUTPATIENT
Start: 2021-05-25 | End: 2021-05-25 | Stop reason: HOSPADM

## 2021-05-25 RX ORDER — SODIUM CHLORIDE, SODIUM LACTATE, POTASSIUM CHLORIDE, CALCIUM CHLORIDE 600; 310; 30; 20 MG/100ML; MG/100ML; MG/100ML; MG/100ML
INJECTION, SOLUTION INTRAVENOUS CONTINUOUS
Status: DISCONTINUED | OUTPATIENT
Start: 2021-05-25 | End: 2021-05-28

## 2021-05-25 RX ORDER — HYDROMORPHONE HYDROCHLORIDE 1 MG/ML
0.4 INJECTION, SOLUTION INTRAMUSCULAR; INTRAVENOUS; SUBCUTANEOUS EVERY 5 MIN PRN
Status: DISCONTINUED | OUTPATIENT
Start: 2021-05-25 | End: 2021-05-25 | Stop reason: HOSPADM

## 2021-05-25 RX ORDER — HYDROCODONE BITARTRATE AND ACETAMINOPHEN 5; 325 MG/1; MG/1
1 TABLET ORAL AS NEEDED
Status: DISCONTINUED | OUTPATIENT
Start: 2021-05-25 | End: 2021-05-25 | Stop reason: HOSPADM

## 2021-05-25 RX ORDER — EPHEDRINE SULFATE 50 MG/ML
INJECTION, SOLUTION INTRAVENOUS AS NEEDED
Status: DISCONTINUED | OUTPATIENT
Start: 2021-05-25 | End: 2021-05-25 | Stop reason: SURG

## 2021-05-25 RX ORDER — POLYETHYLENE GLYCOL 3350 17 G/17G
17 POWDER, FOR SOLUTION ORAL DAILY PRN
Status: DISCONTINUED | OUTPATIENT
Start: 2021-05-25 | End: 2021-05-28

## 2021-05-25 RX ORDER — OXYCODONE HYDROCHLORIDE 5 MG/1
10 TABLET ORAL ONCE
Status: COMPLETED | OUTPATIENT
Start: 2021-05-25 | End: 2021-05-25

## 2021-05-25 RX ORDER — SCOLOPAMINE TRANSDERMAL SYSTEM 1 MG/1
1 PATCH, EXTENDED RELEASE TRANSDERMAL ONCE
Status: DISCONTINUED | OUTPATIENT
Start: 2021-05-25 | End: 2021-05-28

## 2021-05-25 RX ORDER — NEOSTIGMINE METHYLSULFATE 1 MG/ML
INJECTION INTRAVENOUS AS NEEDED
Status: DISCONTINUED | OUTPATIENT
Start: 2021-05-25 | End: 2021-05-25 | Stop reason: SURG

## 2021-05-25 RX ORDER — MORPHINE SULFATE 2 MG/ML
1 INJECTION, SOLUTION INTRAMUSCULAR; INTRAVENOUS EVERY 2 HOUR PRN
Status: DISCONTINUED | OUTPATIENT
Start: 2021-05-25 | End: 2021-05-27

## 2021-05-25 RX ORDER — DOCUSATE SODIUM 100 MG/1
100 CAPSULE, LIQUID FILLED ORAL 2 TIMES DAILY
Status: DISCONTINUED | OUTPATIENT
Start: 2021-05-25 | End: 2021-05-28

## 2021-05-25 RX ORDER — ACETAMINOPHEN 325 MG/1
650 TABLET ORAL EVERY 4 HOURS PRN
Status: DISCONTINUED | OUTPATIENT
Start: 2021-05-25 | End: 2021-05-27

## 2021-05-25 RX ORDER — CEFAZOLIN SODIUM/WATER 2 G/20 ML
2 SYRINGE (ML) INTRAVENOUS EVERY 8 HOURS
Status: COMPLETED | OUTPATIENT
Start: 2021-05-25 | End: 2021-05-26

## 2021-05-25 RX ORDER — DIPHENHYDRAMINE HYDROCHLORIDE 50 MG/ML
25 INJECTION INTRAMUSCULAR; INTRAVENOUS EVERY 4 HOURS PRN
Status: DISCONTINUED | OUTPATIENT
Start: 2021-05-25 | End: 2021-05-28

## 2021-05-25 RX ORDER — METOCLOPRAMIDE 10 MG/1
10 TABLET ORAL ONCE
Status: DISCONTINUED | OUTPATIENT
Start: 2021-05-25 | End: 2021-05-25 | Stop reason: HOSPADM

## 2021-05-25 RX ORDER — METOPROLOL TARTRATE 5 MG/5ML
2.5 INJECTION INTRAVENOUS ONCE
Status: DISCONTINUED | OUTPATIENT
Start: 2021-05-25 | End: 2021-05-25 | Stop reason: HOSPADM

## 2021-05-25 RX ORDER — ONDANSETRON 2 MG/ML
4 INJECTION INTRAMUSCULAR; INTRAVENOUS ONCE AS NEEDED
Status: COMPLETED | OUTPATIENT
Start: 2021-05-25 | End: 2021-05-25

## 2021-05-25 RX ORDER — CEFAZOLIN SODIUM/WATER 2 G/20 ML
2 SYRINGE (ML) INTRAVENOUS ONCE
Status: COMPLETED | OUTPATIENT
Start: 2021-05-25 | End: 2021-05-25

## 2021-05-25 RX ADMIN — EPHEDRINE SULFATE 10 MG: 50 INJECTION, SOLUTION INTRAVENOUS at 14:34:00

## 2021-05-25 RX ADMIN — SODIUM CHLORIDE, SODIUM LACTATE, POTASSIUM CHLORIDE, CALCIUM CHLORIDE: 600; 310; 30; 20 INJECTION, SOLUTION INTRAVENOUS at 14:53:00

## 2021-05-25 RX ADMIN — DEXAMETHASONE SODIUM PHOSPHATE 4 MG: 4 MG/ML VIAL (ML) INJECTION at 12:12:00

## 2021-05-25 RX ADMIN — CEFAZOLIN SODIUM/WATER 2 G: 2 G/20 ML SYRINGE (ML) INTRAVENOUS at 11:40:00

## 2021-05-25 RX ADMIN — ROCURONIUM BROMIDE 30 MG: 10 INJECTION, SOLUTION INTRAVENOUS at 11:17:00

## 2021-05-25 RX ADMIN — ROCURONIUM BROMIDE 10 MG: 10 INJECTION, SOLUTION INTRAVENOUS at 12:25:00

## 2021-05-25 RX ADMIN — GLYCOPYRROLATE 0.2 MG: 0.2 INJECTION, SOLUTION INTRAMUSCULAR; INTRAVENOUS at 12:13:00

## 2021-05-25 RX ADMIN — CEFAZOLIN SODIUM/WATER 2 G: 2 G/20 ML SYRINGE (ML) INTRAVENOUS at 15:40:00

## 2021-05-25 RX ADMIN — NEOSTIGMINE METHYLSULFATE 3 MG: 1 INJECTION INTRAVENOUS at 15:58:00

## 2021-05-25 RX ADMIN — EPHEDRINE SULFATE 10 MG: 50 INJECTION, SOLUTION INTRAVENOUS at 12:18:00

## 2021-05-25 RX ADMIN — SODIUM CHLORIDE: 9 INJECTION, SOLUTION INTRAVENOUS at 11:22:00

## 2021-05-25 RX ADMIN — EPHEDRINE SULFATE 10 MG: 50 INJECTION, SOLUTION INTRAVENOUS at 11:29:00

## 2021-05-25 RX ADMIN — SODIUM CHLORIDE: 9 INJECTION, SOLUTION INTRAVENOUS at 15:06:00

## 2021-05-25 RX ADMIN — GLYCOPYRROLATE 0.6 MG: 0.2 INJECTION, SOLUTION INTRAMUSCULAR; INTRAVENOUS at 15:58:00

## 2021-05-25 RX ADMIN — LIDOCAINE HYDROCHLORIDE 50 MG: 10 INJECTION, SOLUTION EPIDURAL; INFILTRATION; INTRACAUDAL; PERINEURAL at 11:16:00

## 2021-05-25 NOTE — ANESTHESIA PREPROCEDURE EVALUATION
Anesthesia PreOp Note    HPI:     Chyna Nixon is a 82year old female who presents for preoperative consultation requested by: Petra Devi MD    Date of Surgery: 5/25/2021    Procedure(s):  excision of facet cyst lumbar 3-4, laminectomy lum Essential hypertension    • High blood pressure    • High cholesterol    • History of bilateral cataract extraction    • History of total right knee replacement    • Hyperlipidemia    • Lumbar radiculopathy, right    • Osteoarthritis    • PONV (postoperati Bag at 05/25/21 0841  acetaminophen (TYLENOL EXTRA STRENGTH) tab 1,000 mg, 1,000 mg, Oral, Once, Melvenia Boxer, MD  famoTIDine (PEPCID) tab 20 mg, 20 mg, Oral, Once, Melvenia Boxer, MD  Metoclopramide HCl (REGLAN) tab 10 mg, 10 mg, Oral, Once, C per Session:   Stress:       Feeling of Stress :   Social Connections:       Frequency of Communication with Friends and Family:       Frequency of Social Gatherings with Friends and Family:       Attends R[employer]arian Services:       Active Member of Clubs or IV analgesics  Plan Comments: Daughter as   I have discussed the anesthetic plan, major risks and alternatives with the patient and answered all questions. The patient desires to proceed with surgery and anesthesia as planned.    Severly loose fro

## 2021-05-25 NOTE — ANESTHESIA POSTPROCEDURE EVALUATION
Patient: Geovanna Austin    Procedure Summary     Date: 05/25/21 Room / Location: [facility] OR 02 / [facility] OR    Anesthesia Start: 0592 Anesthesia Stop: 7339    Procedure: excision of facet cyst lumbar 3-4, laminectomy lumbar 3-4 and 4-5,  instrumented

## 2021-05-25 NOTE — ANESTHESIA PROCEDURE NOTES
Peripheral IV  Date/Time: 5/25/2021 11:25 AM  Inserted by: Juaquin Ho CRNA    Placement  Needle size: 18 G  Laterality: right  Location: hand  Site prep: alcohol  Technique: anatomical landmarks  Attempts: 1

## 2021-05-25 NOTE — ANESTHESIA PROCEDURE NOTES
Airway  Date/Time: 5/25/2021 11:18 AM  Urgency: Elective    Airway not difficult    General Information and Staff    Patient location during procedure: OR  Anesthesiologist: Ru Moore DO  Resident/CRNA: Dayan Kingston CRNA  Performed: CRNA     In

## 2021-05-25 NOTE — INTERVAL H&P NOTE
Pre-op Diagnosis: Spinal stenosis of lumbar region, unspecified whether neurogenic claudication present [M48.061]  Synovial cyst of lumbar facet joint [M71.38]    The above referenced H&P was reviewed by Mayda Branham MD on 5/25/2021, the patient was

## 2021-05-26 PROBLEM — Z98.1 S/P LAMINECTOMY WITH SPINAL FUSION: Status: ACTIVE | Noted: 2021-05-26

## 2021-05-26 PROCEDURE — 97116 GAIT TRAINING THERAPY: CPT

## 2021-05-26 PROCEDURE — 85014 HEMATOCRIT: CPT | Performed by: ORTHOPAEDIC SURGERY

## 2021-05-26 PROCEDURE — 97162 PT EVAL MOD COMPLEX 30 MIN: CPT

## 2021-05-26 PROCEDURE — 97166 OT EVAL MOD COMPLEX 45 MIN: CPT

## 2021-05-26 PROCEDURE — 97535 SELF CARE MNGMENT TRAINING: CPT

## 2021-05-26 PROCEDURE — 85018 HEMOGLOBIN: CPT | Performed by: ORTHOPAEDIC SURGERY

## 2021-05-26 PROCEDURE — 80048 BASIC METABOLIC PNL TOTAL CA: CPT | Performed by: ORTHOPAEDIC SURGERY

## 2021-05-26 PROCEDURE — 97530 THERAPEUTIC ACTIVITIES: CPT

## 2021-05-26 NOTE — OCCUPATIONAL THERAPY NOTE
OCCUPATIONAL THERAPY EVALUATION - INPATIENT      Room Number: 404/404-A  Evaluation Date: 5/26/2021  Type of Evaluation: Initial       Physician Order: IP Consult to Occupational Therapy  Reason for Therapy: ADL/IADL Dysfunction and Discharge Planning    O Nurse aware of pt performance and position in bed side chair. Pt's current deficits include: lethargy/cognition, generalized weakness/activity tolerance, need for further education on surgical precautions which are to include pt's family.      The pa Procedure Laterality Date   • ANGIOPLASTY (CORONARY)     • CATARACT     • CATH BARE METAL STENT (BMS)      25 years ago, X 1   • CHOLECYSTECTOMY     • COLONOSCOPY     • HC IMPLANT HEART VALVE     • HC PERC TRANSCATH PLMT CORONARY STENT/ANGIO SNGL ARTERY per nurse pt previously stated her goal was to go home today (5/26)    Pt will perform LE dressing tasks with set up and supervision, maintaining spine precuations 1x  Comment:    Pt will perform toileting and toilet xfer with supervision 1x  Comment:    P

## 2021-05-26 NOTE — PLAN OF CARE
Peg is axo4, drowsy at times, reported dizziness with ambulation. Denies shortness of breath or chest pain. On Ra. Patient is voiding, oral intake encourage. Instructed and coached on IS.      Patient has been educated on hand washing, labs have been r severity of pain and evaluate response  - Implement non-pharmacological measures as appropriate and evaluate response  - Consider cultural and social influences on pain and pain management  - Manage/alleviate anxiety  - Utilize distraction and/or relaxatio be responsible for managing their own health  - Refer to Case Management Department for coordinating discharge planning if the patient needs post-hospital services based on physician/LIP order or complex needs related to functional status, cognitive abilit

## 2021-05-26 NOTE — CONSULTS
[facility] Hospitalist Consultation       CC: No chief complaint on file.        Attending Physician: Dr. Nickolas Lovell  Reason for Consultation: Medical Management  PCP: Kori Owens MD    History of Present Illness: Patient is a 82year old female wit Medications:  gabapentin (NEURONTIN) 300 MG Oral Cap, 1 tab in am and 2 tabs at bedtime, Disp: 180 capsule, Rfl: 1  simvastatin 40 MG Oral Tab, Take 1 tablet (40 mg total) by mouth nightly., Disp: 30 tablet, Rfl: 0  hydrochlorothiazide 12.5 MG Oral Cap, Ta or deformity. Heart:  Regular rate and rhythm, S1, S2 normal, 2/6 MIGUEL, no rub or gallop appreciated   Abdomen:   Soft, non-tender. Bowel sounds normal. No masses,  No organomegaly.  Non distended, Wearing abdominal brace   Extremities: Extremities normal, L4-5  -Continue with gabapentin       Nonrheumatic mitral valve regurgitation  -Currently stable       Coronary artery disease involving native coronary artery of native heart without   angina pectoris  -Aspirin currently on hold  -Continue beta-blocker

## 2021-05-26 NOTE — PLAN OF CARE
Pt is up from PACU very drowsy, pt is sleeping on 2L NC with EtCO2 monitor attached, pt is voiding via dc, pt is ao x 4, pt speaks Malaysian but knows english as well, will continue to monitor pt for safety    Problem: PAIN - ADULT  Goal: Verbalizes/displ resources  Description: INTERVENTIONS:  - Identify barriers to discharge w/pt and caregiver  - Include patient/family/discharge partner in discharge planning  - Arrange for needed discharge resources and transportation as appropriate  - Identify discharge

## 2021-05-26 NOTE — PHYSICAL THERAPY NOTE
Attempted to see patient for physical therapy session. Patient remains dizzy and sleepy this afternoon, no change since this morning. Patient has been moving back and forth with RN staff from bedside to bedside chair.  Patient would benefit from continued t

## 2021-05-26 NOTE — PHYSICAL THERAPY NOTE
PHYSICAL THERAPY EVALUATION - INPATIENT     Room Number: 404/404-A  Evaluation Date: 5/26/2021  Type of Evaluation: Initial   Physician Order: PT Eval and Treat    Presenting Problem: excision of facet cyst lumbar 3-4, laminectomy lumbar 3-4 and 4-5,  ins Approx Degree of Impairment: 50.57% has been calculated based on documentation in the [facility] '6 clicks' Inpatient Basic Mobility Short Form.   Research supports that patients with this level of impairment may benefit from rehab facility however patient would HOME SITUATION  Type of Home: House   Home Layout: One level  Stairs to Enter : 0  Railing: No  Stairs to Bedroom: 0  Railing: No    Lives With: Family (able to assist)  Drives: No  Patient Owned Equipment: Rolling walker  Patient Regularly Uses: Non ABILITY STATUS  Gait Assessment   Gait Assistance: Contact guard assist  Distance (ft): 20ft  Assistive Device: Rolling walker     Stoop/Curb Assistance: Not tested       Bed Mobility: min A x 1     Transfers: CGA    Exercise/Education Provided:  Bed mobil

## 2021-05-27 PROCEDURE — 97535 SELF CARE MNGMENT TRAINING: CPT

## 2021-05-27 PROCEDURE — 97530 THERAPEUTIC ACTIVITIES: CPT

## 2021-05-27 PROCEDURE — 97116 GAIT TRAINING THERAPY: CPT

## 2021-05-27 RX ORDER — TIZANIDINE 2 MG/1
2 TABLET ORAL EVERY 8 HOURS PRN
Status: DISCONTINUED | OUTPATIENT
Start: 2021-05-27 | End: 2021-05-28

## 2021-05-27 RX ORDER — TRAMADOL HYDROCHLORIDE 50 MG/1
100 TABLET ORAL EVERY 6 HOURS PRN
Status: DISCONTINUED | OUTPATIENT
Start: 2021-05-27 | End: 2021-05-28

## 2021-05-27 RX ORDER — GABAPENTIN 300 MG/1
300 CAPSULE ORAL NIGHTLY
Status: DISCONTINUED | OUTPATIENT
Start: 2021-05-27 | End: 2021-05-28

## 2021-05-27 RX ORDER — ACETAMINOPHEN 500 MG
500 TABLET ORAL EVERY 6 HOURS
Status: DISCONTINUED | OUTPATIENT
Start: 2021-05-27 | End: 2021-05-28

## 2021-05-27 RX ORDER — TRAMADOL HYDROCHLORIDE 50 MG/1
50 TABLET ORAL EVERY 6 HOURS PRN
Status: DISCONTINUED | OUTPATIENT
Start: 2021-05-27 | End: 2021-05-28

## 2021-05-27 RX ORDER — ACETAMINOPHEN 500 MG
1000 TABLET ORAL EVERY 6 HOURS
Status: DISCONTINUED | OUTPATIENT
Start: 2021-05-27 | End: 2021-05-28

## 2021-05-27 RX ORDER — ONDANSETRON 4 MG/1
4 TABLET, ORALLY DISINTEGRATING ORAL EVERY 8 HOURS PRN
Status: DISCONTINUED | OUTPATIENT
Start: 2021-05-27 | End: 2021-05-28

## 2021-05-27 NOTE — OCCUPATIONAL THERAPY NOTE
OCCUPATIONAL THERAPY TREATMENT NOTE - INPATIENT    Room Number: 056/792-Z    Problem List  Active Problems:    Essential hypertension    Aortic atherosclerosis ([facility] [address]    Coronary artery disease involving native coronary artery of native heart without angina precautions and functional xfer techniques with emphasis on proper body mechanics and posture. Family verbalizes understanding.          The patient's Approx Degree of Impairment: 56.46% has been calculated based on documentation in the [facility] '6 clicks' Inp maintaining spine precuations 1x  Comment: not achieved   Pt will perform toileting and toilet xfer with supervision 1x  Comment: not achieved   Pt and CG will demo understanding of recommended car xfer technique with emphasis on proper body mechanics 1x

## 2021-05-27 NOTE — PAYOR COMM NOTE
--------------  CONTINUED STAY REVIEW    Payor: [facility] Musa Gillespie [place] #:  [id number]  Authorization Number: [id number]    Admit date: 5/25/21  Admit time:  7:47 AM    Admitting Physician: Tristan Lovett MD  Attending Physician:  Chaya Tolentino EXTRA STRENGTH) tab 1,000 mg     Date Action Dose Route User    5/27/2021 1340 Given 1,000 mg Oral Manuel Choudhury, RN         gabapentin (NEURONTIN) cap 300 mg     Date Action Dose Route User    5/27/2021 0747 Given 300 mg Oral Murphy Loza RN    5/26

## 2021-05-27 NOTE — PROGRESS NOTES
Spine Service Progress Note    24hrs/Events: POD#1 s/p L3-5 Laminectomy and Posterolateral Fusion. Walked 20 ft with PT this AM. Lina Chester    Subjective: Doing well. Pain controlled. A little tired this morning per the family. No leg symptoms.  Endorse

## 2021-05-27 NOTE — PROGRESS NOTES
[facility] Hospitalist Progress Note     CC: Hospital Follow up    PCP: Shari Monday, MD       Assessment/Plan:   82year old female with PMH sig for  presents with spinal stenosis, hypertension, coronary artery disease, aortic atherosclerosis and aortic root °C), resp. rate 18, height 4' 11.5\" (1.511 m), weight 147 lb (66.7 kg), SpO2 90 %.     Temp:  [98.6 °F (37 °C)-100.2 °F (37.9 °C)] 99 °F (37.2 °C)  Pulse:  [61-82] 82  Resp:  [16-18] 18  BP: (105-121)/(53-70) 105/55      Intake/Output:    Intake/Output Sum atorvastatin  20 mg Oral Nightly   • Senna  17.2 mg Oral Nightly   • docusate sodium  100 mg Oral BID     • lactated ringers 20 mL/hr at 05/26/21 0358     traMADol HCl, tiZANidine HCl, traMADol HCl, ondansetron, PEG 3350, magnesium hydroxide, bisacodyl, Fl

## 2021-05-27 NOTE — PHYSICAL THERAPY NOTE
PHYSICAL THERAPY TREATMENT NOTE - INPATIENT     Room Number: 924/551-H       Presenting Problem: excision of facet cyst lumbar 3-4, laminectomy lumbar 3-4 and 4-5,  instrumented fusion at lumbar 3-4 and 4-5 (Bilateral Spine Lumbar)    Problem List  Active however patient would benefit from home health and then outpatient physical therapy per MD.    DISCHARGE RECOMMENDATIONS  PT Discharge Recommendations: 24 hour care/supervision;Home with home health PT;Outpatient PT (OPPT per MD)     PLAN  PT Treatment Emeka session/findings; All patient questions and concerns addressed; Family present    CURRENT GOALS   Goals to be met by: 6/10/21  Patient Goal Patient's self-stated goal is: to go home   Goal #1 Patient is able to demonstrate supine - sit EOB @ level: supervisi

## 2021-05-27 NOTE — PROGRESS NOTES
Spine Service Progress Note    24hrs/Events: POD#2 L3-5 Laminectomy and Posterolateral Instrumented Fusion. Too dizzy for PT session yesterday PM. Scopolamine DC;'d. Mostly tylenol and intermittent norco for pain. Subjective: More back pain today.

## 2021-05-27 NOTE — PLAN OF CARE
Patient is drowsy but oriented. Patient is on 1L of O2 while asleep. Highly encouraging IS, coaching on IS. Patient is voiding. Patient reports high pain, norco dced since she is drowsy. Tramadol and tylenol started.      Patient has been educated on hand w Implement non-pharmacological measures as appropriate and evaluate response  - Consider cultural and social influences on pain and pain management  - Manage/alleviate anxiety  - Utilize distraction and/or relaxation techniques  - Monitor for opioid side ef health  - Refer to Case Management Department for coordinating discharge planning if the patient needs post-hospital services based on physician/LIP order or complex needs related to functional status, cognitive ability or social support system  Outcome: P

## 2021-05-28 VITALS
TEMPERATURE: 99 F | SYSTOLIC BLOOD PRESSURE: 110 MMHG | OXYGEN SATURATION: 92 % | BODY MASS INDEX: 29.24 KG/M2 | HEART RATE: 77 BPM | WEIGHT: 147 LBS | RESPIRATION RATE: 18 BRPM | HEIGHT: 59.5 IN | DIASTOLIC BLOOD PRESSURE: 59 MMHG

## 2021-05-28 PROCEDURE — 97530 THERAPEUTIC ACTIVITIES: CPT

## 2021-05-28 PROCEDURE — 97116 GAIT TRAINING THERAPY: CPT

## 2021-05-28 RX ORDER — PSEUDOEPHEDRINE HCL 30 MG
100 TABLET ORAL 2 TIMES DAILY
Qty: 30 CAPSULE | Refills: 0 | Status: SHIPPED | OUTPATIENT
Start: 2021-05-28 | End: 2021-07-06

## 2021-05-28 RX ORDER — TRAMADOL HYDROCHLORIDE 50 MG/1
100 TABLET ORAL EVERY 6 HOURS PRN
Qty: 30 TABLET | Refills: 0 | Status: SHIPPED | OUTPATIENT
Start: 2021-05-28 | End: 2021-06-08

## 2021-05-28 RX ORDER — ONDANSETRON 4 MG/1
4 TABLET, FILM COATED ORAL EVERY 8 HOURS PRN
Qty: 20 TABLET | Refills: 2 | Status: SHIPPED | OUTPATIENT
Start: 2021-05-28 | End: 2021-06-12

## 2021-05-28 RX ORDER — PANTOPRAZOLE SODIUM 40 MG/1
40 TABLET, DELAYED RELEASE ORAL
Status: DISCONTINUED | OUTPATIENT
Start: 2021-05-28 | End: 2021-05-28

## 2021-05-28 RX ORDER — TIZANIDINE 2 MG/1
2 TABLET ORAL EVERY 8 HOURS PRN
Qty: 30 TABLET | Refills: 0 | Status: SHIPPED | OUTPATIENT
Start: 2021-05-28 | End: 2021-06-08

## 2021-05-28 NOTE — PROGRESS NOTES
Spine Service Progress Note    24hrs/Events: POD#3 L3-5 Laminectomy and Posterolateral Instrumented Fusion. Mostly tylenol and intermittent tramadol for pain     Subjective: Pain mostly controlled on tylenol. Feeling nauseous with tramadol too.  Voidin

## 2021-05-28 NOTE — HOME CARE LIAISON
Received referral from [address] [place] [address]. [facility] unable to accept due to not being contracted with patient's insurance. [address] [place] [address] notified.  Thank you for this referral.

## 2021-05-28 NOTE — PLAN OF CARE
Problem: PAIN - ADULT  Goal: Verbalizes/displays adequate comfort level or patient's stated pain goal  Description: INTERVENTIONS:  - Encourage pt to monitor pain and request assistance  - Assess pain using appropriate pain scale  - Administer analgesics resources and transportation as appropriate  - Identify discharge learning needs (meds, wound care, etc)  - Arrange for interpreters to assist at discharge as needed  - Consider post-discharge preferences of patient/family/discharge partner  - Complete ODELL

## 2021-05-28 NOTE — CM/SW NOTE
SW self referred to pt's chart after discussion with RN. RN states therapy is recommending HHC. SW met with pt and family in the room to complete assessment. Pt lives at home with dtr. Per family, dtr works. They are agreeable to [facility].  Family states pt

## 2021-05-28 NOTE — PLAN OF CARE
POD#3. Drowsy, oriented x4 per pt's daughter Simeon Hoffman. OOB with assist using the walker. Fall precautions in place. Bed alarm on &call light in reach. Medicated with Tylenol extra strength & tramadol. Plan to discharge home when stable.   Problem: Patient Cent Progressing     Problem: RISK FOR INFECTION - ADULT  Goal: Absence of fever/infection during anticipated neutropenic period  Description: INTERVENTIONS  - Monitor WBC  - Administer growth factors as ordered  - Implement neutropenic guidelines  Outcome: Pro

## 2021-05-28 NOTE — PHYSICAL THERAPY NOTE
PHYSICAL THERAPY TREATMENT NOTE - INPATIENT     Room Number: 729/233-J       Presenting Problem: excision of facet cyst lumbar 3-4, laminectomy lumbar 3-4 and 4-5,  instrumented fusion at lumbar 3-4 and 4-5 (Bilateral Spine Lumbar)    Problem List  Active RECOMMENDATIONS  PT Discharge Recommendations: Home with home health PT;24 hour care/supervision     PLAN  PT Treatment Plan: Bed mobility; Body mechanics; Endurance; Patient education; Family education;Gait training;Neuromuscular re-educate;Strengthening;[place] self-stated goal is: to go home   Goal #1 Patient is able to demonstrate supine - sit EOB @ level: supervision      Goal #1   Current Status Min A with family assist   Goal #2 Patient is able to demonstrate transfers Sit to/from Stand at assistance level:

## 2021-06-21 NOTE — OPERATIVE REPORT
Operative Note    DOS: 5/25/2021  Patient Name: Pge Solis   Preoperative Diagnosis:   1.) Lumbar Stenosis with Neurogenic Claudication  2.) Lumbar Degenerative Spondylolisthesis  Postoperative Diagnosis: Same  Primary Surgeon: Shelli Rodríguez area, we reviewed elements of the patient's history and physical examination along with the planned surgical procedure. The patient was in agreement and the surgical site was marked with indelible ink.   The patient was administered prophylactic antibiotics and then removed. A bilateral partial medial facetectomy was performed out to the medial wall of the bilateral L4 pedicles.   Then a [employer] could be passed around the sac and roots and out into the neural foramen without any resistance at L3-4 an gutters for our posterolateral arthrodesis. All bleeders were controlled using bipolar and unipolar electrocautery. There was no active bleeding. A total of 1 g of vancomycin powder was evenly distributed throughout the wound.  A Hemovac

## 2021-08-10 PROBLEM — M47.26 OSTEOARTHRITIS OF SPINE WITH RADICULOPATHY, LUMBAR REGION: Status: RESOLVED | Noted: 2021-04-02 | Resolved: 2021-08-10

## 2021-08-10 PROBLEM — G89.29 CHRONIC INTRACTABLE PAIN: Status: RESOLVED | Noted: 2021-04-02 | Resolved: 2021-08-10

## 2022-05-11 ENCOUNTER — APPOINTMENT (OUTPATIENT)
Dept: CARDIOLOGY | Age: 87
End: 2022-05-11

## 2025-01-30 ENCOUNTER — APPOINTMENT (OUTPATIENT)
Dept: GENERAL RADIOLOGY | Facility: HOSPITAL | Age: 89
End: 2025-01-30
Attending: EMERGENCY MEDICINE
Payer: MEDICARE

## 2025-01-30 ENCOUNTER — HOSPITAL ENCOUNTER (OUTPATIENT)
Facility: HOSPITAL | Age: 89
Setting detail: OBSERVATION
Discharge: HOME | End: 2025-01-31
Attending: EMERGENCY MEDICINE | Admitting: HOSPITALIST
Payer: MEDICARE

## 2025-01-30 ENCOUNTER — APPOINTMENT (OUTPATIENT)
Dept: CV DIAGNOSTICS | Facility: HOSPITAL | Age: 89
End: 2025-01-30
Attending: INTERNAL MEDICINE
Payer: MEDICARE

## 2025-01-30 DIAGNOSIS — R07.9 ACUTE CHEST PAIN: Primary | ICD-10-CM

## 2025-01-30 LAB
ALBUMIN SERPL-MCNC: 4.3 G/DL (ref 3.2–4.8)
ALBUMIN/GLOB SERPL: 2 {RATIO} (ref 1–2)
ALP LIVER SERPL-CCNC: 65 U/L
ALT SERPL-CCNC: 10 U/L
ANION GAP SERPL CALC-SCNC: 7 MMOL/L (ref 0–18)
AST SERPL-CCNC: 19 U/L (ref ?–34)
ATRIAL RATE: 64 BPM
ATRIAL RATE: 71 BPM
BASOPHILS # BLD AUTO: 0.03 X10(3) UL (ref 0–0.2)
BASOPHILS NFR BLD AUTO: 0.4 %
BILIRUB SERPL-MCNC: 1.2 MG/DL (ref 0.2–1.1)
BUN BLD-MCNC: 13 MG/DL (ref 9–23)
BUN/CREAT SERPL: 17.1 (ref 10–20)
CALCIUM BLD-MCNC: 10 MG/DL (ref 8.7–10.4)
CHLORIDE SERPL-SCNC: 104 MMOL/L (ref 98–112)
CO2 SERPL-SCNC: 30 MMOL/L (ref 21–32)
CREAT BLD-MCNC: 0.76 MG/DL
D DIMER PPP FEU-MCNC: 0.35 UG/ML FEU (ref ?–0.87)
DEPRECATED RDW RBC AUTO: 41.1 FL (ref 35.1–46.3)
EGFRCR SERPLBLD CKD-EPI 2021: 76 ML/MIN/1.73M2 (ref 60–?)
EOSINOPHIL # BLD AUTO: 0.15 X10(3) UL (ref 0–0.7)
EOSINOPHIL NFR BLD AUTO: 2.1 %
ERYTHROCYTE [DISTWIDTH] IN BLOOD BY AUTOMATED COUNT: 12.6 % (ref 11–15)
FLUAV + FLUBV RNA SPEC NAA+PROBE: NEGATIVE
FLUAV + FLUBV RNA SPEC NAA+PROBE: NEGATIVE
GLOBULIN PLAS-MCNC: 2.2 G/DL (ref 2–3.5)
GLUCOSE BLD-MCNC: 103 MG/DL (ref 70–99)
HCT VFR BLD AUTO: 41 %
HGB BLD-MCNC: 13.5 G/DL
IMM GRANULOCYTES # BLD AUTO: 0.03 X10(3) UL (ref 0–1)
IMM GRANULOCYTES NFR BLD: 0.4 %
LYMPHOCYTES # BLD AUTO: 1.8 X10(3) UL (ref 1–4)
LYMPHOCYTES NFR BLD AUTO: 24.8 %
MCH RBC QN AUTO: 29.2 PG (ref 26–34)
MCHC RBC AUTO-ENTMCNC: 32.9 G/DL (ref 31–37)
MCV RBC AUTO: 88.6 FL
MONOCYTES # BLD AUTO: 0.85 X10(3) UL (ref 0.1–1)
MONOCYTES NFR BLD AUTO: 11.7 %
NEUTROPHILS # BLD AUTO: 4.41 X10 (3) UL (ref 1.5–7.7)
NEUTROPHILS # BLD AUTO: 4.41 X10(3) UL (ref 1.5–7.7)
NEUTROPHILS NFR BLD AUTO: 60.6 %
OSMOLALITY SERPL CALC.SUM OF ELEC: 292 MOSM/KG (ref 275–295)
P AXIS: 24 DEGREES
P AXIS: 28 DEGREES
P-R INTERVAL: 160 MS
P-R INTERVAL: 172 MS
PLATELET # BLD AUTO: 203 10(3)UL (ref 150–450)
POTASSIUM SERPL-SCNC: 3.1 MMOL/L (ref 3.5–5.1)
PROT SERPL-MCNC: 6.5 G/DL (ref 5.7–8.2)
Q-T INTERVAL: 410 MS
Q-T INTERVAL: 422 MS
QRS DURATION: 92 MS
QRS DURATION: 98 MS
QTC CALCULATION (BEZET): 435 MS
QTC CALCULATION (BEZET): 445 MS
R AXIS: -25 DEGREES
R AXIS: -26 DEGREES
RBC # BLD AUTO: 4.63 X10(6)UL
RSV RNA SPEC NAA+PROBE: NEGATIVE
SARS-COV-2 RNA RESP QL NAA+PROBE: NOT DETECTED
SODIUM SERPL-SCNC: 141 MMOL/L (ref 136–145)
T AXIS: 1 DEGREES
T AXIS: 9 DEGREES
TROPONIN I SERPL HS-MCNC: 5 NG/L
TROPONIN I SERPL HS-MCNC: 5 NG/L
TROPONIN I SERPL HS-MCNC: 6 NG/L
VENTRICULAR RATE: 64 BPM
VENTRICULAR RATE: 71 BPM
WBC # BLD AUTO: 7.3 X10(3) UL (ref 4–11)

## 2025-01-30 PROCEDURE — 99285 EMERGENCY DEPT VISIT HI MDM: CPT

## 2025-01-30 PROCEDURE — 84484 ASSAY OF TROPONIN QUANT: CPT | Performed by: EMERGENCY MEDICINE

## 2025-01-30 PROCEDURE — 93010 ELECTROCARDIOGRAM REPORT: CPT

## 2025-01-30 PROCEDURE — 84484 ASSAY OF TROPONIN QUANT: CPT | Performed by: HOSPITALIST

## 2025-01-30 PROCEDURE — 36415 COLL VENOUS BLD VENIPUNCTURE: CPT

## 2025-01-30 PROCEDURE — 85379 FIBRIN DEGRADATION QUANT: CPT | Performed by: EMERGENCY MEDICINE

## 2025-01-30 PROCEDURE — 71045 X-RAY EXAM CHEST 1 VIEW: CPT | Performed by: EMERGENCY MEDICINE

## 2025-01-30 PROCEDURE — 93005 ELECTROCARDIOGRAM TRACING: CPT

## 2025-01-30 PROCEDURE — 85025 COMPLETE CBC W/AUTO DIFF WBC: CPT | Performed by: EMERGENCY MEDICINE

## 2025-01-30 PROCEDURE — 93306 TTE W/DOPPLER COMPLETE: CPT | Performed by: INTERNAL MEDICINE

## 2025-01-30 PROCEDURE — 80053 COMPREHEN METABOLIC PANEL: CPT | Performed by: EMERGENCY MEDICINE

## 2025-01-30 PROCEDURE — B246ZZZ ULTRASONOGRAPHY OF RIGHT AND LEFT HEART: ICD-10-PCS | Performed by: INTERNAL MEDICINE

## 2025-01-30 PROCEDURE — 0241U SARS-COV-2/FLU A AND B/RSV BY PCR (GENEXPERT): CPT | Performed by: EMERGENCY MEDICINE

## 2025-01-30 RX ORDER — GABAPENTIN 600 MG/1
600 TABLET ORAL NIGHTLY
Status: DISCONTINUED | OUTPATIENT
Start: 2025-01-30 | End: 2025-01-31

## 2025-01-30 RX ORDER — METOPROLOL SUCCINATE 25 MG/1
25 TABLET, EXTENDED RELEASE ORAL DAILY
COMMUNITY
Start: 2024-12-04

## 2025-01-30 RX ORDER — BISACODYL 10 MG
10 SUPPOSITORY, RECTAL RECTAL
Status: DISCONTINUED | OUTPATIENT
Start: 2025-01-30 | End: 2025-01-31

## 2025-01-30 RX ORDER — ATORVASTATIN CALCIUM 40 MG/1
40 TABLET, FILM COATED ORAL NIGHTLY
Status: DISCONTINUED | OUTPATIENT
Start: 2025-01-30 | End: 2025-01-30

## 2025-01-30 RX ORDER — METOPROLOL SUCCINATE 25 MG/1
25 TABLET, EXTENDED RELEASE ORAL DAILY
Status: DISCONTINUED | OUTPATIENT
Start: 2025-01-30 | End: 2025-01-31

## 2025-01-30 RX ORDER — POLYETHYLENE GLYCOL 3350 17 G/17G
17 POWDER, FOR SOLUTION ORAL DAILY PRN
Status: DISCONTINUED | OUTPATIENT
Start: 2025-01-30 | End: 2025-01-31

## 2025-01-30 RX ORDER — SENNOSIDES 8.6 MG
17.2 TABLET ORAL NIGHTLY PRN
Status: DISCONTINUED | OUTPATIENT
Start: 2025-01-30 | End: 2025-01-31

## 2025-01-30 RX ORDER — ACETAMINOPHEN 500 MG
500 TABLET ORAL EVERY 4 HOURS PRN
Status: DISCONTINUED | OUTPATIENT
Start: 2025-01-30 | End: 2025-01-31

## 2025-01-30 RX ORDER — PANTOPRAZOLE SODIUM 40 MG/1
40 TABLET, DELAYED RELEASE ORAL
Status: DISCONTINUED | OUTPATIENT
Start: 2025-01-31 | End: 2025-01-31

## 2025-01-30 RX ORDER — ASPIRIN 81 MG/1
1 TABLET, CHEWABLE ORAL DAILY
COMMUNITY

## 2025-01-30 RX ORDER — ASPIRIN 81 MG/1
81 TABLET, CHEWABLE ORAL DAILY
Status: DISCONTINUED | OUTPATIENT
Start: 2025-01-30 | End: 2025-01-31

## 2025-01-30 RX ORDER — PNV NO.95/FERROUS FUM/FOLIC AC 28MG-0.8MG
1 TABLET ORAL EVERY OTHER DAY
COMMUNITY

## 2025-01-30 RX ORDER — POTASSIUM CHLORIDE 1500 MG/1
40 TABLET, EXTENDED RELEASE ORAL ONCE
Status: COMPLETED | OUTPATIENT
Start: 2025-01-30 | End: 2025-01-30

## 2025-01-30 RX ORDER — MEMANTINE HYDROCHLORIDE 10 MG/1
10 TABLET ORAL 2 TIMES DAILY
COMMUNITY
Start: 2024-12-22

## 2025-01-30 RX ORDER — MORPHINE SULFATE 4 MG/ML
4 INJECTION, SOLUTION INTRAMUSCULAR; INTRAVENOUS ONCE
Status: DISCONTINUED | OUTPATIENT
Start: 2025-01-30 | End: 2025-01-31

## 2025-01-30 RX ORDER — ENOXAPARIN SODIUM 100 MG/ML
40 INJECTION SUBCUTANEOUS DAILY
Status: DISCONTINUED | OUTPATIENT
Start: 2025-02-01 | End: 2025-01-31

## 2025-01-30 RX ORDER — MEMANTINE HYDROCHLORIDE 5 MG/1
10 TABLET ORAL 2 TIMES DAILY
Status: DISCONTINUED | OUTPATIENT
Start: 2025-01-30 | End: 2025-01-31

## 2025-01-30 RX ORDER — ONDANSETRON 2 MG/ML
4 INJECTION INTRAMUSCULAR; INTRAVENOUS EVERY 6 HOURS PRN
Status: DISCONTINUED | OUTPATIENT
Start: 2025-01-30 | End: 2025-01-31

## 2025-01-30 RX ORDER — ATORVASTATIN CALCIUM 20 MG/1
20 TABLET, FILM COATED ORAL NIGHTLY
Status: DISCONTINUED | OUTPATIENT
Start: 2025-01-30 | End: 2025-01-31

## 2025-01-30 RX ORDER — NITROGLYCERIN 0.4 MG/1
0.4 TABLET SUBLINGUAL ONCE
Status: COMPLETED | OUTPATIENT
Start: 2025-01-30 | End: 2025-01-30

## 2025-01-30 NOTE — CONSULTS
Cardiology Consultation  [facility]    Peg Snyder Patient Status:  Emergency    1935 MRN [id number]   Location [facility] EMERGENCY DEPARTMENT Attending Amanuel Healy*   Hosp Day # 0 PCP Alicia Noble MD     Primary Cardiologist: Dr. Cheung    Reason for Consultation:  Chest pain    History of Present Illness:  Peg Snyder is a a(n) 89 year old female with CAD (PCI 20 years ago), hypertension, hyperlipidemia, CVA, moderate AI, who presents for chest pain that began around 4 AM.  Described as left-sided, knife-like without radiation and lasting for 10 min.  She cannot definitively say what type of chest pain she felt during the time that resulted in her last PCI. No LE edema, SOB, diaphoresis, nausea.     High-sensitivity troponin negative x 2, D-dimer negative.  Chest x-ray without pulmonary edema  EKG without ST elevation.    Assessment/Plan:    Chest pain, intermediate risk. Chest pain fairly non-cardiac appearing, but she has multiple risk factors for obstructive CAD. No signs of ACS.  CAD (hx PCI at [facility] 25 years ago)  HTN  HDL  CVA    -- Nuc stress with chio  -- TTE repeat  -- Continue ASA, statin, beta-blocker  -- Okay to hold off on IV heparin given negative trop x 2  -- Tele      CARDIAC IMAGING:    TTE 2024  1. Left ventricle: The cavity size is normal. Wall thickness is normal.      Systolic function is normal by visual assessment. The estimated ejection      fraction is 55-60%. Wall motion is normal; there are no regional wall      motion abnormalities.   2. Aortic valve: Trileaflet. The leaflets are mildly thickened and mildly      calcified. There is moderate regurgitation. The peak systolic velocity is      1.8m/sec. The mean systolic gradient is 7mm Hg. The peak systolic      gradient is 14mm Hg.   3. Mitral valve: The annulus is calcified. There is moderate regurgitation.   4. Left atrium: The atrium is moderately dilated.   5. Right  ventricle: The cavity size is normal. Wall thickness is normal.      Systolic function is normal by visual assessment. Estimation of the right      ventricular systolic pressure is within the normal range. The RV pressure      during systole is 23mm Hg.   6. Tricuspid valve: There is mild regurgitation.   7. Pericardium, extracardiac: There is no significant pericardial effusion.         History:  Past Medical History:    Cataract    Coronary artery disease    stent years ago at [facility]. Unclear details. Subsequent stress test negative.    Esophageal reflux    Essential hypertension    History of bilateral cataract extraction    History of lumbar laminectomy for spinal cord decompression    L3-L5 laminectomy and posterior lateral instrumented fusion    History of total right knee replacement    Hyperlipidemia    Lumbar radiculopathy, right    Osteoarthritis    PONV (postoperative nausea and vomiting)    Stroke (HCC)    25 years ago, no residual side effects     Past Surgical History:   Procedure Laterality Date    Angioplasty (coronary)      Cataract      Cath bare metal stent (bms)      25 years ago, X 1    Cholecystectomy      Colonoscopy      Hc implant heart valve c1889      Hc perc transcath plmt coronary stent/angio sngl artery      Knee replacement surgery Right      Family History   Problem Relation Age of Onset    Other (natural causes) Mother     Other (Liver disease) Father     Other (hodgkins lymphoma) Other       reports that she has never smoked. She has never used smokeless tobacco. She reports that she does not drink alcohol and does not use drugs.    Allergies:  Allergies[1]    Medications:  Medications Ordered Prior to Encounter[2]    Review of Systems:  As above, otherwise negative      Physical Exam:  Blood pressure 129/64, pulse 65, temperature 99.1 °F (37.3 °C), temperature source Oral, resp. rate 22, weight 125 lb (56.7 kg), SpO2 96%.  Wt Readings from Last 3 Encounters:   01/30/25 125 lb  (56.7 kg)   12/13/21 131 lb 9.6 oz (59.7 kg)   08/10/21 141 lb 9.6 oz (64.2 kg)       General: awake, alert, oriented x 3, no acute distress  HEENT: at/nc, perrl, eomi  Neck: No JVD, carotids 2+ no bruits.  Cardiac: Regular rate and rhythm, S1, S2 normal, no murmur, rub or gallop.  Lungs: Clear without wheezes, rales, rhonchi or dullness.  Normal excursions and effort.  Abdomen: Soft, non-tender, non-distended, normal bowel sounds   Extremities: Without clubbing, cyanosis or edema.  Peripheral pulses are 2+.  Neurologic: Alert and oriented, normal affect.  Psych: normal mood and affect  Skin: Warm and dry.       Laboratories and Data:  Diagnostics:      Labs:   CBC:    Lab Results   Component Value Date    WBC 7.3 01/30/2025    WBC 6.48 08/10/2021    WBC 5.59 05/04/2021     Lab Results   Component Value Date    HGB 13.5 01/30/2025    HGB 9.6 (L) 08/10/2021    HGB 9.5 (L) 05/26/2021      Lab Results   Component Value Date    .0 01/30/2025     08/10/2021     05/04/2021     BMP:   No results found for: \"GLUCOSE\"  Lab Results   Component Value Date    K 3.1 (L) 01/30/2025    K 4.09 08/10/2021    K 4.2 05/26/2021     Lab Results   Component Value Date    BUN 13 01/30/2025    BUN 13.0 08/10/2021    BUN 12 05/26/2021     Lab Results   Component Value Date    CREATSERUM 0.76 01/30/2025    CREATSERUM 0.63 08/10/2021    CREATSERUM 0.71 05/26/2021     Cholesterol:     Lab Results   Component Value Date    CHOLEST 129.00 05/04/2021    CHOLEST 132 03/03/2020    CHOLEST 116 07/26/2019     Lab Results   Component Value Date    HDL 47 05/04/2021    HDL 58 03/03/2020    HDL 53 07/26/2019     Lab Results   Component Value Date    TRIG 186.00 (H) 05/04/2021    TRIG 108 03/03/2020    TRIG 109 07/26/2019     Lab Results   Component Value Date    LDL 45 05/04/2021    LDL 52 03/03/2020    LDL 41 07/26/2019     Lab Results   Component Value Date    AST 19 01/30/2025    AST 17 05/04/2021    AST 16 12/03/2020     Lab  Results   Component Value Date    ALT 10 01/30/2025    ALT 8 05/04/2021    ALT 17 12/03/2020           Miguel Angel Amezcua MD  Interventional Cardiology  [place]  1/30/2025  1:35 PM         [1] No Known Allergies  [2]   No current facility-administered medications on file prior to encounter.     Current Outpatient Medications on File Prior to Encounter   Medication Sig Dispense Refill    hydroCHLOROthiazide 12.5 MG Oral Cap Take 1 capsule (12.5 mg total) by mouth daily. 90 capsule 1    GABAPENTIN 600 MG Oral Tab TAKE 1 TABLET(600 MG) BY MOUTH EVERY NIGHT 90 tablet 1    cefdinir 300 MG Oral Cap Take 1 capsule (300 mg total) by mouth 2 (two) times daily. 14 capsule 0    metoprolol tartrate 50 MG Oral Tab Take 1 tablet (50 mg total) by mouth 2 (two) times daily. 180 tablet 1    simvastatin 40 MG Oral Tab Take 1 tablet (40 mg total) by mouth nightly. 90 tablet 1    PANTOPRAZOLE 40 MG Oral Tab EC TAKE 1 TABLET(40 MG) BY MOUTH EVERY MORNING BEFORE BREAKFAST 90 tablet 3    acetaminophen 325 MG Oral Tab Take 325 mg by mouth every 6 (six) hours as needed for Pain.      nitroGLYCERIN 0.4 MG Sublingual SL Tab Place 1 tablet (0.4 mg total) under the tongue every 5 (five) minutes as needed for Chest pain. 30 tablet 1

## 2025-01-30 NOTE — ED INITIAL ASSESSMENT (HPI)
Pt to ED from home for chest pain and shortness of breath starting this am. Pt took 0.4 sublingual nitroglycerin at home. Medics administered an additional dose of SL nitroglycerin en route as well as 324 ASA.     +pain on inspiration. Extensive cardiac hx. Some lightheadedness.

## 2025-01-30 NOTE — ED PROVIDER NOTES
Patient Seen in: [facility] Emergency Department      History     Chief Complaint   Patient presents with    Chest Pain Angina     Stated Complaint: CP/STEPH    Subjective:   HPI      89-year-old female presents for evaluation for chest pain.  Pain began around 4 AM, has been coming and going, left-sided, pressure-like, does not radiate.  Patient reports associated shortness of breath and lightheadedness.  She denies any nausea, vomiting, fever, chills, cough, recent travel or prolonged immobilization, leg pain or swelling.  She denies any hemoptysis.  Language line  used for Polish interpretation.  EMS reports that she did take 1 nitroglycerin prior to arrival, they administered another nitroglycerin as well as 4 baby aspirin.    Objective:     Past Medical History:    Cataract    Coronary artery disease    stent years ago at [facility]. Unclear details. Subsequent stress test negative.    Esophageal reflux    Essential hypertension    History of bilateral cataract extraction    History of lumbar laminectomy for spinal cord decompression    L3-L5 laminectomy and posterior lateral instrumented fusion    History of total right knee replacement    Hyperlipidemia    Lumbar radiculopathy, right    Osteoarthritis    PONV (postoperative nausea and vomiting)    Stroke (HCC)    25 years ago, no residual side effects              Past Surgical History:   Procedure Laterality Date    Angioplasty (coronary)      Cataract      Cath bare metal stent (bms)      25 years ago, X 1    Cholecystectomy      Colonoscopy      Hc implant heart valve c1889      Hc perc transcath plmt coronary stent/angio sngl artery      Knee replacement surgery Right                 Social History     Socioeconomic History    Marital status:    Tobacco Use    Smoking status: Never    Smokeless tobacco: Never   Vaping Use    Vaping status: Never Used   Substance and Sexual Activity    Alcohol use: No    Drug use: No   Social History  Narrative    Lives in own home with daughter.  last week (3/2021)    Homemaker.     12 children (oldest daughter  from [place])                  Physical Exam     ED Triage Vitals [25 0939]   /77   Pulse 80   Resp 16   Temp 99.1 °F (37.3 °C)   Temp src Oral   SpO2 97 %   O2 Device None (Room air)       Current Vitals:   Vital Signs  BP: 129/64  Pulse: 65  Resp: 22  Temp: 99.1 °F (37.3 °C)  Temp src: Oral  MAP (mmHg): 84    Oxygen Therapy  SpO2: 96 %  O2 Device: None (Room air)        Physical Exam  Vitals and nursing note reviewed.   Constitutional:       Appearance: Normal appearance.   HENT:      Head: Normocephalic and atraumatic.   Cardiovascular:      Rate and Rhythm: Normal rate and regular rhythm.      Pulses: Normal pulses.           Radial pulses are 2+ on the right side and 2+ on the left side.        Dorsalis pedis pulses are 2+ on the right side and 2+ on the left side.        Posterior tibial pulses are 2+ on the right side and 2+ on the left side.      Heart sounds: Normal heart sounds.   Pulmonary:      Effort: Pulmonary effort is normal.      Breath sounds: Normal breath sounds.   Abdominal:      General: Bowel sounds are normal.      Palpations: Abdomen is soft.      Tenderness: There is no abdominal tenderness. There is no guarding or rebound.   Musculoskeletal:         General: Normal range of motion.      Cervical back: Normal range of motion.      Right lower leg: No edema.      Left lower leg: No edema.   Skin:     General: Skin is warm and dry.   Neurological:      General: No focal deficit present.      Mental Status: She is alert.             ED Course     Labs Reviewed   COMP METABOLIC PANEL (14) - Abnormal; Notable for the following components:       Result Value    Glucose 103 (*)     Potassium 3.1 (*)     Bilirubin, Total 1.2 (*)     All other components within normal limits   TROPONIN I HIGH SENSITIVITY - Normal   D-DIMER - Normal   TROPONIN I HIGH SENSITIVITY -  Normal   SARS-COV-2/FLU A AND B/RSV BY PCR (GENEXPERT) - Normal    Narrative:     This test is intended for the qualitative detection and differentiation of SARS-CoV-2, influenza A, influenza B, and respiratory syncytial virus (RSV) viral RNA in nasopharyngeal or nares swabs from individuals suspected of respiratory viral infection consistent with COVID-19 by their healthcare provider. Signs and symptoms of respiratory viral infection due to SARS-CoV-2, influenza, and RSV can be similar.    Test performed using the Xpert Xpress SARS-CoV-2/FLU/RSV (real time RT-PCR)  assay on the GeneXpert instrument, [employer], [place] [address].   This test is being used under the Food and Drug Administration's Emergency Use Authorization.    The authorized Fact Sheet for Healthcare Providers for this assay is available upon request from the laboratory.   CBC WITH DIFFERENTIAL WITH PLATELET   RAINBOW DRAW BLUE     EKG    Rate, intervals and axes as noted on EKG Report.  Rate: 71  Rhythm: Sinus Rhythm  Reading: no stemi, interpreted by myself.     EKG    Rate, intervals and axes as noted on EKG Report.  Rate: 64  Rhythm: Sinus Rhythm  Reading: no stemi, interpreted by myself.                    MDM        Heart Score:    HEART Score      Title      Criteria Score   Age: 65 and older Age Score: 2   History: Mod Suspicious Hx Score: 1     EKG: Normal EKG Score: 0   HTN: Yes   Hypercholesterolemia: Yes   Atherosclerosis/PVD: No     DM: No   BMI>30kg/m2: No   Smoking: No         Other Risk Factor Score: 2             Lab Results   Component Value Date    TROPHS 6 01/30/2025           HEART Score: 4        Risk of adverse cardiac event is 12-16.6%              Admission disposition: 1/30/2025  1:26 PM           Medical Decision Making  Differential diagnosis includes but is not limited to ACS, PNA, PE, referred abdominal pain, etc    CXR unremarkable. CBC and BMP are unremarkable. Zero and two hour troponin are within normal limits. Zero  and two hour EKGs are without acute ischemic changes. D-dimer is normal PE, unlikely. Patient received 3 doses of sublingual nitroglycerin with 1 at home, 1 by EMS and 1 in the ED.  Her pain is very mild at this time.  Patient with elevated heart score.  She will be admitted for further workup with cardiology on consult.    Rhythm Strip: Rate 68 sinus rhythm as interpreted by myself. The cardiac monitor was ordered secondary to the patient's chest pain.     Complicating factors: The patient  has a past medical history of Cataract, Coronary artery disease, Esophageal reflux, Essential hypertension, History of bilateral cataract extraction, History of lumbar laminectomy for spinal cord decompression, History of total right knee replacement, Hyperlipidemia, Lumbar radiculopathy, right, Osteoarthritis, PONV (postoperative nausea and vomiting), and Stroke (HCC). and  has a past surgical history that includes knee replacement surgery (Right); cholecystectomy; cataract; hc implant heart valve c1889; colonoscopy; angioplasty (coronary); hc perc transcath plmt coronary stent/angio sngl artery; and cath bare metal stent (bms). that contribute to the medical complexity of this ED evaluation.     Medical Record Review: I personally reviewed available prior medical records for any recent pertinent discharge summaries, testing, and procedures, and reviewed those reports.            Problems Addressed:  Acute chest pain: acute illness or injury with systemic symptoms    Amount and/or Complexity of Data Reviewed  External Data Reviewed: notes.     Details: Stress Echo from 3/23/20 reviewed, normal stress, EF >75%  Labs: ordered. Decision-making details documented in ED Course.  Radiology: ordered and independent interpretation performed. Decision-making details documented in ED Course.  ECG/medicine tests: ordered and independent interpretation performed. Decision-making details documented in ED Course.  Discussion of management or  test interpretation with external provider(s): Discussed with Dr. Hampton who accepts admission and Dr. Amezcua with cards.     Risk  Decision regarding hospitalization.        Disposition and Plan     Clinical Impression:  1. Acute chest pain         Disposition:  Admit  1/30/2025  1:26 pm    Follow-up:  No follow-up provider specified.  We recommend that you schedule follow up care with a primary care provider within the next three months to obtain basic health screening including reassessment of your blood pressure.      Medications Prescribed:  Current Discharge Medication List              Supplementary Documentation:         Hospital Problems       Present on Admission  Date Reviewed: 12/13/2021            ICD-10-CM Noted POA    * (Principal) Acute chest pain R07.9 1/30/2025 Unknown

## 2025-01-30 NOTE — ED QUICK NOTES
Orders for admission, patient is aware of plan and ready to go upstairs. Any questions, please call ED RN Ivanna Duvall at extension [phone].     Patient Covid vaccination status: Fully vaccinated     COVID Test Ordered in ED: SARS-CoV-2/Flu A and B/RSV by PCR (GeneXpert)    COVID Suspicion at Admission: N/A    Running Infusions:  None    Mental Status/LOC at time of transport: A/Ox4; hx dementia    Other pertinent information: Indonesian speaking; ambulatory  CIWA score: N/A   NIH score:  N/A

## 2025-01-30 NOTE — H&P
[facility] Hospitalist H&P       CC:   Chief Complaint   Patient presents with    Chest Pain Angina        PCP: Alicia Noble MD    History of Present Illness: Ms. Snyder is a 89 year old female with PMH sig for spinal stenosis, HTN, CAD hs of stent, HLD, mild alzheimer dementia, spinal stenosis, aortic atherosclerosis, hs of stroke, who presents with chest pain.  Patient and family state that today she was walking from her room to the bathroom when she began to feel dyspnea, and noted a sharp left sided pain in her chest, non-radiating, improved only after giving nitrog, EMS was called, was associated with dizziness, no diaphoresis or nausea, no fevers or chills, no recent cp or sob with activity or exertion.          PMH  Past Medical History:    Cataract    Coronary artery disease    stent years ago at [facility]. Unclear details. Subsequent stress test negative.    Esophageal reflux    Essential hypertension    History of bilateral cataract extraction    History of lumbar laminectomy for spinal cord decompression    L3-L5 laminectomy and posterior lateral instrumented fusion    History of total right knee replacement    Hyperlipidemia    Lumbar radiculopathy, right    Osteoarthritis    PONV (postoperative nausea and vomiting)    Stroke (HCC)    25 years ago, no residual side effects        PSH  Past Surgical History:   Procedure Laterality Date    Angioplasty (coronary)      Cataract      Cath bare metal stent (bms)      25 years ago, X 1    Cholecystectomy      Colonoscopy      Hc implant heart valve c1889      Hc perc transcath plmt coronary stent/angio sngl artery      Knee replacement surgery Right         ALL:  Allergies[1]     Home Medications:  Medications Taking[2]      Soc Hx  Social History     Tobacco Use    Smoking status: Never    Smokeless tobacco: Never   Substance Use Topics    Alcohol use: No        Fam Hx  Family History   Problem Relation Age of Onset    Other (natural causes)  Mother     Other (Liver disease) Father     Other (hodgkins lymphoma) Other        Review of Systems  Comprehensive ROS reviewed and negative except for what's stated above.     OBJECTIVE:  /64   Pulse 65   Temp 99.1 °F (37.3 °C) (Oral)   Resp 22   Wt 125 lb (56.7 kg)   SpO2 96%   BMI 27.05 kg/m²   General:  Alert, no distress   Head:  Normocephalic, without obvious abnormality, atraumatic.   Eyes:  Sclera anicteric, No conjunctival pallor,    Nose: Nares normal. Septum midline.     Throat: Lips, mucosa, and tongue normal.     Neck: Supple,     Lungs:   Clear to auscultation bilaterally. Normal effort   Chest wall:  No tenderness or deformity.   Heart:  Regular rate and rhythm,    Abdomen:   Soft, non-tender. Bowel sounds normal. No masses,  No organomegaly. Non distended   Extremities: Extremities normal, atraumatic, no cyanosis or edema.   Skin: Skin color, texture, turgor normal. No rashes or lesions.    Neurologic: Normal strength, no focal deficit appreciated     Diagnostic Data:    CBC/Chem  Recent Labs   Lab 01/30/25  1000   WBC 7.3   HGB 13.5   MCV 88.6   .0       Recent Labs   Lab 01/30/25  1000      K 3.1*      CO2 30.0   BUN 13   CREATSERUM 0.76   *   CA 10.0       Recent Labs   Lab 01/30/25  1000   ALT 10   AST 19   ALB 4.3       No results for input(s): \"TROP\" in the last 168 hours.     Radiology: No results found.      ASSESSMENT / PLAN:   Ms. Snyder is a 89 year old female with PMH sig for spinal stenosis, HTN, CAD hs of stent, HLD, mild alzheimer dementia, spinal stenosis, aortic atherosclerosis, hs of stroke, who presents with chest pain.     Chest pain  Hs of CAD  - Hs of stent in past  - trop negative *2, d-dimer negative, CKD neg, ECG reviewed    - pain free following nitro  - serial trops  - tele  - asa, statin  - cardiology consulted, further management per cards  - stress test and echo planned    HTN  - resume metop  - hold thiazide    HLD  - check  lipids  - statin    Hs of dementia  Hs of stroke   - continue asa and statin  - resume memantine    FN:  - IVF: none  - Diet: adv diet    DVT Prophy:scd, lovneox  Lines: PIV    Dispo: pending clinical course    Outpatient records or previous hospital records reviewed.     Further recommendations pending patient's clinical course.  [facility] hospitalist to continue to follow patient while in house    Patient and/or patient's family given opportunity to ask questions and note understanding and agreeing with therapeutic plan as outlined    Thank You,  Edison Hampton MD    Hospitalist with [facility]  Answering Service number: [phone]         [1] No Known Allergies  [2]   No outpatient medications have been marked as taking for the 1/30/25 encounter (Hospital Encounter).

## 2025-01-31 ENCOUNTER — APPOINTMENT (OUTPATIENT)
Dept: NUCLEAR MEDICINE | Facility: HOSPITAL | Age: 89
End: 2025-01-31
Attending: INTERNAL MEDICINE
Payer: MEDICARE

## 2025-01-31 ENCOUNTER — APPOINTMENT (OUTPATIENT)
Dept: CV DIAGNOSTICS | Facility: HOSPITAL | Age: 89
End: 2025-01-31
Attending: INTERNAL MEDICINE
Payer: MEDICARE

## 2025-01-31 VITALS
OXYGEN SATURATION: 94 % | BODY MASS INDEX: 25.38 KG/M2 | HEART RATE: 81 BPM | WEIGHT: 112.81 LBS | SYSTOLIC BLOOD PRESSURE: 113 MMHG | RESPIRATION RATE: 19 BRPM | HEIGHT: 56 IN | TEMPERATURE: 99 F | DIASTOLIC BLOOD PRESSURE: 72 MMHG

## 2025-01-31 LAB
% OF MAX PREDICTED HR: 100 %
ANION GAP SERPL CALC-SCNC: 7 MMOL/L (ref 0–18)
BASOPHILS # BLD AUTO: 0.02 X10(3) UL (ref 0–0.2)
BASOPHILS NFR BLD AUTO: 0.4 %
BUN BLD-MCNC: 12 MG/DL (ref 9–23)
BUN/CREAT SERPL: 16.9 (ref 10–20)
CALCIUM BLD-MCNC: 10 MG/DL (ref 8.7–10.4)
CHLORIDE SERPL-SCNC: 107 MMOL/L (ref 98–112)
CHOLEST SERPL-MCNC: 138 MG/DL (ref ?–200)
CO2 SERPL-SCNC: 29 MMOL/L (ref 21–32)
CREAT BLD-MCNC: 0.71 MG/DL
DEPRECATED RDW RBC AUTO: 41.9 FL (ref 35.1–46.3)
EGFRCR SERPLBLD CKD-EPI 2021: 82 ML/MIN/1.73M2 (ref 60–?)
EOSINOPHIL # BLD AUTO: 0.11 X10(3) UL (ref 0–0.7)
EOSINOPHIL NFR BLD AUTO: 2 %
ERYTHROCYTE [DISTWIDTH] IN BLOOD BY AUTOMATED COUNT: 12.7 % (ref 11–15)
GLUCOSE BLD-MCNC: 90 MG/DL (ref 70–99)
HCT VFR BLD AUTO: 40.9 %
HDLC SERPL-MCNC: 55 MG/DL (ref 40–59)
HGB BLD-MCNC: 13.4 G/DL
IMM GRANULOCYTES # BLD AUTO: 0.02 X10(3) UL (ref 0–1)
IMM GRANULOCYTES NFR BLD: 0.4 %
LDLC SERPL CALC-MCNC: 63 MG/DL (ref ?–100)
LYMPHOCYTES # BLD AUTO: 1.13 X10(3) UL (ref 1–4)
LYMPHOCYTES NFR BLD AUTO: 20.4 %
MAGNESIUM SERPL-MCNC: 2 MG/DL (ref 1.6–2.6)
MAX DIASTOLIC BP: 60 MMHG
MAX HEART RATE: 87 BPM
MAX PREDICTED HEART RATE: 133 BPM
MAX SYSTOLIC BP: 124 MMHG
MAX WORK LOAD: 10
MCH RBC QN AUTO: 29.1 PG (ref 26–34)
MCHC RBC AUTO-ENTMCNC: 32.8 G/DL (ref 31–37)
MCV RBC AUTO: 88.7 FL
MONOCYTES # BLD AUTO: 0.66 X10(3) UL (ref 0.1–1)
MONOCYTES NFR BLD AUTO: 11.9 %
NEUTROPHILS # BLD AUTO: 3.6 X10 (3) UL (ref 1.5–7.7)
NEUTROPHILS # BLD AUTO: 3.6 X10(3) UL (ref 1.5–7.7)
NEUTROPHILS NFR BLD AUTO: 64.9 %
NONHDLC SERPL-MCNC: 83 MG/DL (ref ?–130)
OSMOLALITY SERPL CALC.SUM OF ELEC: 295 MOSM/KG (ref 275–295)
PLATELET # BLD AUTO: 198 10(3)UL (ref 150–450)
POTASSIUM SERPL-SCNC: 3.8 MMOL/L (ref 3.5–5.1)
POTASSIUM SERPL-SCNC: 3.8 MMOL/L (ref 3.5–5.1)
RBC # BLD AUTO: 4.61 X10(6)UL
SODIUM SERPL-SCNC: 143 MMOL/L (ref 136–145)
TRIGL SERPL-MCNC: 108 MG/DL (ref 30–149)
TROPONIN I SERPL HS-MCNC: 5 NG/L
VLDLC SERPL CALC-MCNC: 16 MG/DL (ref 0–30)
WBC # BLD AUTO: 5.5 X10(3) UL (ref 4–11)

## 2025-01-31 PROCEDURE — 84132 ASSAY OF SERUM POTASSIUM: CPT | Performed by: HOSPITALIST

## 2025-01-31 PROCEDURE — 93016 CV STRESS TEST SUPVJ ONLY: CPT | Performed by: INTERNAL MEDICINE

## 2025-01-31 PROCEDURE — 93018 CV STRESS TEST I&R ONLY: CPT | Performed by: INTERNAL MEDICINE

## 2025-01-31 PROCEDURE — 78452 HT MUSCLE IMAGE SPECT MULT: CPT | Performed by: INTERNAL MEDICINE

## 2025-01-31 PROCEDURE — 85025 COMPLETE CBC W/AUTO DIFF WBC: CPT | Performed by: HOSPITALIST

## 2025-01-31 PROCEDURE — 80048 BASIC METABOLIC PNL TOTAL CA: CPT | Performed by: HOSPITALIST

## 2025-01-31 PROCEDURE — 83735 ASSAY OF MAGNESIUM: CPT | Performed by: HOSPITALIST

## 2025-01-31 PROCEDURE — 84484 ASSAY OF TROPONIN QUANT: CPT | Performed by: HOSPITALIST

## 2025-01-31 PROCEDURE — 4A12XM4 MONITORING OF CARDIAC STRESS, EXTERNAL APPROACH: ICD-10-PCS | Performed by: RADIOLOGY

## 2025-01-31 PROCEDURE — 80061 LIPID PANEL: CPT | Performed by: HOSPITALIST

## 2025-01-31 PROCEDURE — 93017 CV STRESS TEST TRACING ONLY: CPT | Performed by: INTERNAL MEDICINE

## 2025-01-31 RX ORDER — REGADENOSON 0.08 MG/ML
INJECTION, SOLUTION INTRAVENOUS
Status: COMPLETED
Start: 2025-01-31 | End: 2025-01-31

## 2025-01-31 NOTE — PLAN OF CARE
No more chest pain. Patient medically cleared to discharge. Tele and IV removed. Discharge education completed. Questions answered. Daughter at bedside providing a ride home.   Problem: Patient Centered Care  Goal: Patient preferences are identified and integrated in the patient's plan of care  Description: Interventions:  - Provide timely, complete, and accurate information to patient/family  - Incorporate patient and family knowledge, values, beliefs, and cultural backgrounds into the planning and delivery of care  - Encourage patient/family to participate in care and decision-making at the level they choose  - Honor patient and family perspectives and choices  1/31/2025 1247 by Tram Reeder, RN  Outcome: Adequate for Discharge  1/31/2025 1135 by Tram Reeder, RN  Outcome: Progressing     Problem: CARDIOVASCULAR - ADULT  Goal: Maintains optimal cardiac output and hemodynamic stability  Description: INTERVENTIONS:  - Monitor vital signs, rhythm, and trends  - Monitor for bleeding, hypotension and signs of decreased cardiac output  - Evaluate effectiveness of vasoactive medications to optimize hemodynamic stability  - Monitor arterial and/or venous puncture sites for bleeding and/or hematoma  - Assess quality of pulses, skin color and temperature  - Assess for signs of decreased coronary artery perfusion - ex. Angina  - Evaluate fluid balance, assess for edema, trend weights  1/31/2025 1247 by Tram Reeder, RN  Outcome: Adequate for Discharge  1/31/2025 1135 by Tram Reeder RN  Outcome: Progressing  Goal: Absence of cardiac arrhythmias or at baseline  Description: INTERVENTIONS:  - Continuous cardiac monitoring, monitor vital signs, obtain 12 lead EKG if indicated  - Evaluate effectiveness of antiarrhythmic and heart rate control medications as ordered  - Initiate emergency measures for life threatening arrhythmias  - Monitor electrolytes and administer replacement therapy as ordered  1/31/2025 1247 by  Sogura, Tram, RN  Outcome: Adequate for Discharge  1/31/2025 1135 by Tram Reeder RN  Outcome: Progressing     Problem: PAIN - ADULT  Goal: Verbalizes/displays adequate comfort level or patient's stated pain goal  Description: INTERVENTIONS:  - Encourage pt to monitor pain and request assistance  - Assess pain using appropriate pain scale  - Administer analgesics based on type and severity of pain and evaluate response  - Implement non-pharmacological measures as appropriate and evaluate response  - Consider cultural and social influences on pain and pain management  - Manage/alleviate anxiety  - Utilize distraction and/or relaxation techniques  - Monitor for opioid side effects  - Notify MD/LIP if interventions unsuccessful or patient reports new pain  - Anticipate increased pain with activity and pre-medicate as appropriate  1/31/2025 1247 by Tram Reeder RN  Outcome: Adequate for Discharge  1/31/2025 1135 by Tram Reeder RN  Outcome: Progressing     Problem: SAFETY ADULT - FALL  Goal: Free from fall injury  Description: INTERVENTIONS:  - Assess pt frequently for physical needs  - Identify cognitive and physical deficits and behaviors that affect risk of falls.  - [place] fall precautions as indicated by assessment.  - Educate pt/family on patient safety including physical limitations  - Instruct pt to call for assistance with activity based on assessment  - Modify environment to reduce risk of injury  - Provide assistive devices as appropriate  - Consider OT/PT consult to assist with strengthening/mobility  - Encourage toileting schedule  1/31/2025 1247 by Tram Reeder RN  Outcome: Adequate for Discharge  1/31/2025 1135 by Tram Reeder RN  Outcome: Progressing     Problem: DISCHARGE PLANNING  Goal: Discharge to home or other facility with appropriate resources  Description: INTERVENTIONS:  - Identify barriers to discharge w/pt and caregiver  - Include patient/family/discharge partner in  discharge planning  - Arrange for needed discharge resources and transportation as appropriate  - Identify discharge learning needs (meds, wound care, etc)  - Arrange for interpreters to assist at discharge as needed  - Consider post-discharge preferences of patient/family/discharge partner  - Complete POLST form as appropriate  - Assess patient's ability to be responsible for managing their own health  - Refer to Case Management Department for coordinating discharge planning if the patient needs post-hospital services based on physician/LIP order or complex needs related to functional status, cognitive ability or social support system  1/31/2025 1247 by Tram Reeder, RN  Outcome: Adequate for Discharge  1/31/2025 1135 by Tram Reeder, RN  Outcome: Progressing     Problem: Altered Communication/Language Barrier  Goal: Patient/Family is able to understand and participate in their care  Description: Interventions:  - Assess communication ability and preferred communication style  - Implement communication aides and strategies  - Use visual cues when possible  - Listen attentively, be patient, do not interrupt  - Minimize distractions  - Allow time for understanding and response  - Establish method for patient to ask for assistance (call light)  - Provide an  as needed  - Communicate barriers and strategies to overcome with those who interact with patient  1/31/2025 1247 by Tram Reeder, RN  Outcome: Adequate for Discharge  1/31/2025 1135 by Tram Reeder, RN  Outcome: Progressing

## 2025-01-31 NOTE — DISCHARGE SUMMARY
General Medicine Discharge Summary     Patient ID:  Peg Snyder  89 year old  6/28/1935    Admit date: 1/30/2025    Discharge date and time: 1/31/2025     Attending Physician: Edison Hampton MD     Consults: IP CONSULT TO CARDIOLOGY    Primary Care Physician: Alicia Noble MD     Reason for admission: chest pain    Risk For Readmission: low    Discharge Diagnoses: Acute chest pain [R07.9]  See Additional Discharge Diagnoses in Hospital Course    Discharged Condition: good    Follow-up with labs/images appointments: FU with PCP and cardiology     Exam  Gen: No acute distress  Pulm: Lungs clear, normal respiratory effort  CV: Heart with regular rate and rhythm  Abd: Abdomen soft,   EXT: no edema     HPI:     History of Present Illness: Ms. Snyder is a 89 year old female with PMH sig for spinal stenosis, HTN, CAD hs of stent, HLD, mild alzheimer dementia, spinal stenosis, aortic atherosclerosis, hs of stroke, who presents with chest pain.  Patient and family state that today she was walking from her room to the bathroom when she began to feel dyspnea, and noted a sharp left sided pain in her chest, non-radiating, improved only after giving nitrog, EMS was called, was associated with dizziness, no diaphoresis or nausea, no fevers or chills, no recent cp or sob with activity or exertion.        Hospital Course:   Ms. Snyder is a 89 year old female with PMH sig for spinal stenosis, HTN, CAD hs of stent, HLD, mild alzheimer dementia, spinal stenosis, aortic atherosclerosis, hs of stroke, who presents with chest pain, trops neg *3, DDimer negative, CXR negative, symptoms resolved, ECHO with normal EF and now WMA, cardiology consulted, recommended stress test which showed normal perfusion and EF.  Plan for continued home meds, and DC home once cleared by cardiology and FU with PCP next week.       Operative Procedures:      Imaging: CARD NUC STRESS TEST LEXISCAN (CPT 21852/04383/)    Result Date:  1/31/2025  CONCLUSION:  Normal regadenoson (Lexiscan) perfusion imaging study with normal ejection fraction and wall motion. There is no evidence of reversible stress-induced ischemia.    Dictated by (CST): Yosvany Schwartz MD on 1/31/2025 at 11:44 AM     Finalized by (CST): Yosvany Schwartz MD on 1/31/2025 at 11:45 AM             Disposition: home    Activity: activity as tolerated  Diet: cardiac diet  Wound Care: as directed  Code Status: Full Code      Home Medication Changes:     Med list     Medication List        CONTINUE taking these medications      aspirin 81 MG Chew     gabapentin 600 MG Tabs  Commonly known as: Neurontin  TAKE 1 TABLET(600 MG) BY MOUTH EVERY NIGHT     hydroCHLOROthiazide 12.5 MG Caps  Take 1 capsule (12.5 mg total) by mouth daily.     Iron 325 (65 Fe) MG Tabs     memantine 10 MG Tabs  Commonly known as: Namenda     metoprolol succinate ER 25 MG Tb24  Commonly known as: Toprol XL     nitroglycerin 0.4 MG Subl  Commonly known as: Nitrostat  Place 1 tablet (0.4 mg total) under the tongue every 5 (five) minutes as needed for Chest pain.     pantoprazole 40 MG Tbec  Commonly known as: Protonix  TAKE 1 TABLET(40 MG) BY MOUTH EVERY MORNING BEFORE BREAKFAST     simvastatin 40 MG Tabs  Commonly known as: Zocor  Take 1 tablet (40 mg total) by mouth nightly.              FU   Follow-up Information       Alicia Noble MD. Schedule an appointment as soon as possible for a visit in 1 week(s).    Specialty: Internal Medicine  Contact information:  [address]  [address]  [place] [address]  [phone]               Juaquin Cheung MD. Schedule an appointment as soon as possible for a visit in 1 week(s).    Specialties: Cardiovascular Diseases, CARDIOLOGY  Contact information:  [address]   [address]  [place] [address]  [phone]                             DC instructions:      Other Discharge Instructions:         Please follow up with PCP in 1 week.         I reconciled current  and discharge medications on day of discharge, discussed changes with patient and noted changes above.       Total Time Coordinating Care: Greater than 30 minutes    Patient had opportunity to ask questions and state understand and agree with therapeutic plan as outlined    Thank You,    Edison Hampton MD   Hospitalist with [facility]

## 2025-01-31 NOTE — PLAN OF CARE
Patient slept well, no c/o chest pain overnight. Plan for stress test today. Consent in signed and in chart. Family at bedside to help with interpretation.     Problem: Patient Centered Care  Goal: Patient preferences are identified and integrated in the patient's plan of care  Description: Interventions:  - What would you like us to know as we care for you?   - Provide timely, complete, and accurate information to patient/family  - Incorporate patient and family knowledge, values, beliefs, and cultural backgrounds into the planning and delivery of care  - Encourage patient/family to participate in care and decision-making at the level they choose  - Honor patient and family perspectives and choices  Outcome: Progressing     Problem: Patient/Family Goals  Goal: Patient/Family Long Term Goal  Description: Patient's Long Term Goal:     Interventions:  -   - See additional Care Plan goals for specific interventions  Outcome: Progressing  Goal: Patient/Family Short Term Goal  Description: Patient's Short Term Goal:     Interventions:   -   - See additional Care Plan goals for specific interventions  Outcome: Progressing     Problem: CARDIOVASCULAR - ADULT  Goal: Maintains optimal cardiac output and hemodynamic stability  Description: INTERVENTIONS:  - Monitor vital signs, rhythm, and trends  - Monitor for bleeding, hypotension and signs of decreased cardiac output  - Evaluate effectiveness of vasoactive medications to optimize hemodynamic stability  - Monitor arterial and/or venous puncture sites for bleeding and/or hematoma  - Assess quality of pulses, skin color and temperature  - Assess for signs of decreased coronary artery perfusion - ex. Angina  - Evaluate fluid balance, assess for edema, trend weights  Outcome: Progressing  Goal: Absence of cardiac arrhythmias or at baseline  Description: INTERVENTIONS:  - Continuous cardiac monitoring, monitor vital signs, obtain 12 lead EKG if indicated  - Evaluate  effectiveness of antiarrhythmic and heart rate control medications as ordered  - Initiate emergency measures for life threatening arrhythmias  - Monitor electrolytes and administer replacement therapy as ordered  Outcome: Progressing     Problem: PAIN - ADULT  Goal: Verbalizes/displays adequate comfort level or patient's stated pain goal  Description: INTERVENTIONS:  - Encourage pt to monitor pain and request assistance  - Assess pain using appropriate pain scale  - Administer analgesics based on type and severity of pain and evaluate response  - Implement non-pharmacological measures as appropriate and evaluate response  - Consider cultural and social influences on pain and pain management  - Manage/alleviate anxiety  - Utilize distraction and/or relaxation techniques  - Monitor for opioid side effects  - Notify MD/LIP if interventions unsuccessful or patient reports new pain  - Anticipate increased pain with activity and pre-medicate as appropriate  Outcome: Progressing     Problem: SAFETY ADULT - FALL  Goal: Free from fall injury  Description: INTERVENTIONS:  - Assess pt frequently for physical needs  - Identify cognitive and physical deficits and behaviors that affect risk of falls.  - [place] fall precautions as indicated by assessment.  - Educate pt/family on patient safety including physical limitations  - Instruct pt to call for assistance with activity based on assessment  - Modify environment to reduce risk of injury  - Provide assistive devices as appropriate  - Consider OT/PT consult to assist with strengthening/mobility  - Encourage toileting schedule  Outcome: Progressing     Problem: DISCHARGE PLANNING  Goal: Discharge to home or other facility with appropriate resources  Description: INTERVENTIONS:  - Identify barriers to discharge w/pt and caregiver  - Include patient/family/discharge partner in discharge planning  - Arrange for needed discharge resources and transportation as appropriate  -  Identify discharge learning needs (meds, wound care, etc)  - Arrange for interpreters to assist at discharge as needed  - Consider post-discharge preferences of patient/family/discharge partner  - Complete POLST form as appropriate  - Assess patient's ability to be responsible for managing their own health  - Refer to Case Management Department for coordinating discharge planning if the patient needs post-hospital services based on physician/LIP order or complex needs related to functional status, cognitive ability or social support system  Outcome: Progressing     Problem: Altered Communication/Language Barrier  Goal: Patient/Family is able to understand and participate in their care  Description: Interventions:  - Assess communication ability and preferred communication style  - Implement communication aides and strategies  - Use visual cues when possible  - Listen attentively, be patient, do not interrupt  - Minimize distractions  - Allow time for understanding and response  - Establish method for patient to ask for assistance (call light)  - Provide an  as needed  - Communicate barriers and strategies to overcome with those who interact with patient  Outcome: Progressing

## 2025-01-31 NOTE — PROGRESS NOTES
[facility]  [facility]  Cardiology Progress Note    Peg Snyder Patient Status:  Observation    1935 MRN [id number]   Location [facility] 3W/SW Attending Edison Hampton MD   Hosp Day # 0 PCP Alicia Noble MD       Impression:    Chest pain, intermediate risk. Chest pain fairly non-cardiac appearing, but she has multiple risk factors for obstructive CAD. No signs of ACS.  - Echo with normal LV function, no valve disease  - Nuc stress negative  CAD (hx PCI at [facility] years ago)  HTN  HDL  CVA     -- TTE repeat  -- Continue ASA, statin, beta-blocker    Okay for discharge from CV standpoint.       Subjective:   No chest pain today.    Patient Active Problem List   Diagnosis    Essential hypertension    Aortic atherosclerosis    Spinal stenosis at L4-L5 level    Nonrheumatic mitral valve regurgitation    Coronary artery disease involving native coronary artery of native heart without angina pectoris    Hypercholesterolemia    Age-related osteoporosis without current pathological fracture    Aortic root dilatation    Spondylolisthesis at L4-L5 level    S/P laminectomy with spinal fusion    Acute chest pain       Objective:   Temp: 99.1 °F (37.3 °C)  Pulse: 81  Resp: 19  BP: 113/72    Intake/Output:     Intake/Output Summary (Last 24 hours) at 2025 1316  Last data filed at 2025 1112  Gross per 24 hour   Intake 340 ml   Output 1 ml   Net 339 ml       Last 3 Weights   25 0533 112 lb 12.8 oz (51.2 kg)   25 1804 112 lb 1.6 oz (50.8 kg)   25 0939 125 lb (56.7 kg)   21 1052 131 lb 9.6 oz (59.7 kg)   08/10/21 1111 141 lb 9.6 oz (64.2 kg)       Tele: NSR    Physical Exam:    General: Alert and oriented x 3. No apparent distress. No respiratory or constitutional distress.  HEENT: Normocephalic, anicteric sclera, neck supple, no thyromegaly or adenopathy.  Neck: No JVD, carotids 2+, no bruits.  Cardiac: Regular rate and rhythm. S1, S2 normal. No murmur,  pericardial rub, S3, thrill, heave or extra cardiac sounds.  Lungs: Clear without wheezes, rales, rhonchi or dullness.  Normal excursions and effort.  Abdomen: Soft, non-tender. No organosplenomegally, mass or rebound, BS-present.  Extremities: Without clubbing, cyanosis or edema.  Peripheral pulses are 2+.  Neurologic: Alert and oriented, normal affect. No motor or coordinational deficit.  Skin: Warm and dry.     Laboratory/Data:    Labs:         Recent Labs   Lab 01/30/25  1000 01/31/25  0746   WBC 7.3 5.5   HGB 13.5 13.4   MCV 88.6 88.7   .0 198.0       Recent Labs   Lab 01/30/25  1000 01/31/25  0746    143   K 3.1* 3.8  3.8    107   CO2 30.0 29.0   BUN 13 12   CREATSERUM 0.76 0.71   CA 10.0 10.0   MG  --  2.0   * 90       Recent Labs   Lab 01/30/25  1000   ALT 10   AST 19   ALB 4.3       No results for input(s): \"TROP\" in the last 168 hours.    Allergies:   Allergies[1]    Medications:  Current Facility-Administered Medications   Medication Dose Route Frequency    aspirin chewable tab 81 mg  81 mg Oral Daily    gabapentin (Neurontin) tab 600 mg  600 mg Oral Nightly    memantine (Namenda) tab 10 mg  10 mg Oral BID    metoprolol succinate ER (Toprol XL) 24 hr tab 25 mg  25 mg Oral Daily    pantoprazole (Protonix) DR tab 40 mg  40 mg Oral QAM AC    [START ON 2/1/2025] enoxaparin (Lovenox) 40 MG/0.4ML SUBQ injection 40 mg  40 mg Subcutaneous Daily    acetaminophen (Tylenol Extra Strength) tab 500 mg  500 mg Oral Q4H PRN    melatonin tab 3 mg  3 mg Oral Nightly PRN    polyethylene glycol (PEG 3350) (Miralax) 17 g oral packet 17 g  17 g Oral Daily PRN    sennosides (Senokot) tab 17.2 mg  17.2 mg Oral Nightly PRN    bisacodyl (Dulcolax) 10 MG rectal suppository 10 mg  10 mg Rectal Daily PRN    ondansetron (Zofran) 4 MG/2ML injection 4 mg  4 mg Intravenous Q6H PRN    atorvastatin (Lipitor) tab 20 mg  20 mg Oral Nightly       Miguel Angel Amezcua MD  1/31/2025  1:16 PM         [1] No Known  Allergies

## 2025-01-31 NOTE — PROGRESS NOTES
From home with dtr Stacey (other dtr Peyton is also C aid)  PMH: spinal stenosis, HTN, CAD with stents, HLD, mild alzheimer's and dementia, aortic atherosclerosis, stroke.  Came in with SOB and chest pain, was given x2 nitroglycerin and 342 asa  Trops (-), d dimer (-)  PLAN: Stress test, ECHO, trend trops

## 2025-02-12 NOTE — LETTER
[place] [address]    Consent for Diagnostic Services    Your physician has ordered one of the following tests to determine the function of your heart: Regadenoson Sestamibi. The information obtained will aid your physician in detecting the possible presence of heart disease, to determine why you may have such symptoms such as chest pain or shortness of breath and to determine an appropriate plan of medical management.    The risks associated with any exercise test or pharmacological stress test are similar to the risks associated with exercise, including an abnormal blood pressure, dizziness, fainting, abnormal heart rate and in very rear instances heart attach, stroke, or death. During the test you must report any unusual feeling or symptoms you experience during the test. Every effort will be made to minimize such risks by careful observation during the test. Emergency equipment and trained personnel are available to deal with unusual situations, which may arise and these personnel have permission to treat you.     During the treadmill or nuclear stress test, the exercise intensity begins at a low level and advances in stages depending on your fitness level. We may stop the test at any time because of signs of fatigue or changes in your heart rate, electrocardiogram, or blood pressure, or other symptoms you may experience.     If your doctor has ordered a pharmacological stress test, you may experience a headache, shortness of breath, flushing, warmth, rapid heart rate, or a bitter taste in your mouth. Sometimes you may not experience any symptoms. Your prompt reporting of any symptoms is very important.     During a Regadenoson stress test, you are given an injection of Regadenoson. Immediately after the Regadenoson is given, you will be injected with a radioactive isotope. During a Dobutamine stress test, Dobutamine infuses over approximately fifteen minutes. A radioactive isotope is  injected during the infusion. After either pharmacological stress test, you will have either a nuclear scan or an echocardiogram.    The information that is obtained during your testing will be treated as privileged and confidential. The information obtained will be given to your doctor and may be used for insurance purposes or to track and trend data as part of  with your privacy retained.    I have read and understand the above information. I voluntarily agree and consent to the above ordered test. Furthermore, no guarantees or assurances have been given by anyone as to the results that may be obtained from this procedure. Any questions that may have occurred to me have been answered to my satisfaction.     Signature of Patient:   ____________________________________________________________    Signature of Witness: _______________________________Date: ___________Time: ________   None

## (undated) DEVICE — PERIFIX® 18 GA. X 3-1/2 IN. (90 MM) TUOHY, 5 ML GLASS LUER LOCK LOR TRAY (KIT): Brand: PERIFIX®

## (undated) DEVICE — 11.1-M5 MULTIMODALITY KIT 5

## (undated) DEVICE — DRAPE SHEET LG

## (undated) DEVICE — GAMMEX® PI HYBRID SIZE 7.5, STERILE POWDER-FREE SURGICAL GLOVE, POLYISOPRENE AND NEOPRENE BLEND: Brand: GAMMEX

## (undated) DEVICE — 3.0MM PRECISION NEURO (MATCH HEAD)

## (undated) DEVICE — 3M™ TEGADERM™ TRANSPARENT FILM DRESSING, 1626W, 4 IN X 4-3/4 IN (10 CM X 12 CM), 50 EACH/CARTON, 4 CARTON/CASE: Brand: 3M™ TEGADERM™

## (undated) DEVICE — Device: Brand: MEDEX

## (undated) DEVICE — OMNIPAQUE 240ML VIAL

## (undated) DEVICE — PROXIMATE SKIN STAPLERS (35 WIDE) CONTAINS 35 STAINLESS STEEL STAPLES (FIXED HEAD): Brand: PROXIMATE

## (undated) DEVICE — SUTURE MONOCRYL 3-0 Y936H

## (undated) DEVICE — FLOSEAL HEMOSTATIC MATRIX, 5ML: Brand: FLOSEAL HEMOSTATIC MATRIX

## (undated) DEVICE — C-ARMOR C-ARM EQUIPMENT COVERS CLEAR STERILE UNIVERSAL FIT 12 PER CASE: Brand: C-ARMOR

## (undated) DEVICE — ADHESIVE MASTISOL 2/3CC VL

## (undated) DEVICE — PACK SRG UNV 1 STRL LF

## (undated) DEVICE — SUTURE VICRYL 0 CT-1

## (undated) DEVICE — NEEDLE HPO 18GA 1.5IN ECLPS

## (undated) DEVICE — KIT DRN 1/8IN PVC 3 SPRG EVAC

## (undated) DEVICE — CHLORAPREP 26ML APPLICATOR

## (undated) DEVICE — SUTURE VICRYL 2-0 JJ42G

## (undated) DEVICE — CAUTERY BLADE 2IN INS E1455

## (undated) DEVICE — SOL  .9 1000ML BTL

## (undated) DEVICE — GAUZE SPONGES,12 PLY: Brand: CURITY

## (undated) DEVICE — SUCTION CANISTER, 3000CC,SAFELINER: Brand: DEROYAL

## (undated) DEVICE — LAMINECTOMY: Brand: MEDLINE INDUSTRIES, INC.

## (undated) DEVICE — SURGICEL FIBULAR 2X4

## (undated) DEVICE — CONTAINER SPEC STR 4OZ GRY LID

## (undated) DEVICE — GAMMEX® PI HYBRID SIZE 8.5, STERILE POWDER-FREE SURGICAL GLOVE, POLYISOPRENE AND NEOPRENE BLEND: Brand: GAMMEX

## (undated) DEVICE — NVM5 PROBE SNGL USE STER PKG

## (undated) DEVICE — X-RAY DETECTABLE SPONGES,16 PLY: Brand: VISTEC

## (undated) DEVICE — STANDARD HYPODERMIC NEEDLE,POLYPROPYLENE HUB: Brand: MONOJECT

## (undated) DEVICE — INSULATED BLADE ELECTRODE 6.5

## (undated) DEVICE — Device

## (undated) DEVICE — GAMMEX® PI HYBRID SIZE 6.5, STERILE POWDER-FREE SURGICAL GLOVE, POLYISOPRENE AND NEOPRENE BLEND: Brand: GAMMEX

## (undated) NOTE — LETTER
May 26, 2021      Patient: Jose Whiting   YOB: 1935   Date of Visit: 4/28/2021         To Whom It May Concern: This certifies that Jose Whiting was seen in my office today.  Please excuse her from { school/work:[id number]} t